# Patient Record
Sex: MALE | Race: WHITE | NOT HISPANIC OR LATINO | Employment: OTHER | URBAN - METROPOLITAN AREA
[De-identification: names, ages, dates, MRNs, and addresses within clinical notes are randomized per-mention and may not be internally consistent; named-entity substitution may affect disease eponyms.]

---

## 2017-05-13 ENCOUNTER — HOSPITAL ENCOUNTER (EMERGENCY)
Facility: HOSPITAL | Age: 62
Discharge: HOME | End: 2017-05-13
Attending: EMERGENCY MEDICINE | Admitting: EMERGENCY MEDICINE
Payer: COMMERCIAL

## 2017-05-13 ENCOUNTER — APPOINTMENT (EMERGENCY)
Dept: RADIOLOGY | Facility: HOSPITAL | Age: 62
End: 2017-05-13
Payer: COMMERCIAL

## 2017-05-13 ENCOUNTER — APPOINTMENT (EMERGENCY)
Dept: CT IMAGING | Facility: HOSPITAL | Age: 62
End: 2017-05-13
Payer: COMMERCIAL

## 2017-05-13 VITALS
SYSTOLIC BLOOD PRESSURE: 154 MMHG | WEIGHT: 216.2 LBS | OXYGEN SATURATION: 96 % | DIASTOLIC BLOOD PRESSURE: 75 MMHG | TEMPERATURE: 98.2 F | HEART RATE: 60 BPM | RESPIRATION RATE: 16 BRPM

## 2017-05-13 DIAGNOSIS — R07.9 RIGHT-SIDED CHEST PAIN: Primary | ICD-10-CM

## 2017-05-13 LAB
ALBUMIN SERPL BCP-MCNC: 4.3 G/DL (ref 3.5–5)
ALP SERPL-CCNC: 60 U/L (ref 46–116)
ALT SERPL W P-5'-P-CCNC: 28 U/L (ref 12–78)
ANION GAP SERPL CALCULATED.3IONS-SCNC: 8 MMOL/L (ref 4–13)
APTT PPP: 26 SECONDS (ref 23–35)
AST SERPL W P-5'-P-CCNC: 19 U/L (ref 5–45)
ATRIAL RATE: 57 BPM
BASOPHILS # BLD AUTO: 0.02 THOUSANDS/ΜL (ref 0–0.1)
BASOPHILS NFR BLD AUTO: 0 % (ref 0–1)
BILIRUB DIRECT SERPL-MCNC: 0.11 MG/DL (ref 0–0.2)
BILIRUB SERPL-MCNC: 0.6 MG/DL (ref 0.2–1)
BUN SERPL-MCNC: 17 MG/DL (ref 5–25)
CALCIUM SERPL-MCNC: 9.6 MG/DL (ref 8.3–10.1)
CHLORIDE SERPL-SCNC: 103 MMOL/L (ref 100–108)
CO2 SERPL-SCNC: 30 MMOL/L (ref 21–32)
CREAT SERPL-MCNC: 1.07 MG/DL (ref 0.6–1.3)
EOSINOPHIL # BLD AUTO: 0.07 THOUSAND/ΜL (ref 0–0.61)
EOSINOPHIL NFR BLD AUTO: 1 % (ref 0–6)
ERYTHROCYTE [DISTWIDTH] IN BLOOD BY AUTOMATED COUNT: 12.7 % (ref 11.6–15.1)
GFR SERPL CREATININE-BSD FRML MDRD: >60 ML/MIN/1.73SQ M
GLUCOSE SERPL-MCNC: 168 MG/DL (ref 65–140)
HCT VFR BLD AUTO: 36 % (ref 36.5–49.3)
HGB BLD-MCNC: 12.4 G/DL (ref 12–17)
INR PPP: 1.1 (ref 0.86–1.16)
LYMPHOCYTES # BLD AUTO: 1.64 THOUSANDS/ΜL (ref 0.6–4.47)
LYMPHOCYTES NFR BLD AUTO: 25 % (ref 14–44)
MCH RBC QN AUTO: 29 PG (ref 26.8–34.3)
MCHC RBC AUTO-ENTMCNC: 34.4 G/DL (ref 31.4–37.4)
MCV RBC AUTO: 84 FL (ref 82–98)
MONOCYTES # BLD AUTO: 0.55 THOUSAND/ΜL (ref 0.17–1.22)
MONOCYTES NFR BLD AUTO: 9 % (ref 4–12)
NEUTROPHILS # BLD AUTO: 4.22 THOUSANDS/ΜL (ref 1.85–7.62)
NEUTS SEG NFR BLD AUTO: 65 % (ref 43–75)
NT-PROBNP SERPL-MCNC: 117 PG/ML
P AXIS: 62 DEGREES
PLATELET # BLD AUTO: 234 THOUSANDS/UL (ref 149–390)
PMV BLD AUTO: 9.8 FL (ref 8.9–12.7)
POTASSIUM SERPL-SCNC: 4.6 MMOL/L (ref 3.5–5.3)
PR INTERVAL: 172 MS
PROT SERPL-MCNC: 7.1 G/DL (ref 6.4–8.2)
PROTHROMBIN TIME: 14.6 SECONDS (ref 12.1–14.4)
QRS AXIS: -28 DEGREES
QRSD INTERVAL: 114 MS
QT INTERVAL: 434 MS
QTC INTERVAL: 415 MS
RBC # BLD AUTO: 4.27 MILLION/UL (ref 3.88–5.62)
SODIUM SERPL-SCNC: 141 MMOL/L (ref 136–145)
T WAVE AXIS: 39 DEGREES
TROPONIN I SERPL-MCNC: <0.02 NG/ML
TROPONIN I SERPL-MCNC: <0.02 NG/ML
VENTRICULAR RATE: 55 BPM
WBC # BLD AUTO: 6.5 THOUSAND/UL (ref 4.31–10.16)

## 2017-05-13 PROCEDURE — 71020 HB CHEST X-RAY 2VW FRONTAL&LATL: CPT

## 2017-05-13 PROCEDURE — 84484 ASSAY OF TROPONIN QUANT: CPT | Performed by: EMERGENCY MEDICINE

## 2017-05-13 PROCEDURE — 96374 THER/PROPH/DIAG INJ IV PUSH: CPT

## 2017-05-13 PROCEDURE — 99285 EMERGENCY DEPT VISIT HI MDM: CPT

## 2017-05-13 PROCEDURE — 74175 CTA ABDOMEN W/CONTRAST: CPT

## 2017-05-13 PROCEDURE — 85610 PROTHROMBIN TIME: CPT | Performed by: EMERGENCY MEDICINE

## 2017-05-13 PROCEDURE — 71275 CT ANGIOGRAPHY CHEST: CPT

## 2017-05-13 PROCEDURE — 80076 HEPATIC FUNCTION PANEL: CPT | Performed by: EMERGENCY MEDICINE

## 2017-05-13 PROCEDURE — 96361 HYDRATE IV INFUSION ADD-ON: CPT

## 2017-05-13 PROCEDURE — 93005 ELECTROCARDIOGRAM TRACING: CPT

## 2017-05-13 PROCEDURE — 80048 BASIC METABOLIC PNL TOTAL CA: CPT | Performed by: EMERGENCY MEDICINE

## 2017-05-13 PROCEDURE — 85025 COMPLETE CBC W/AUTO DIFF WBC: CPT | Performed by: EMERGENCY MEDICINE

## 2017-05-13 PROCEDURE — 83880 ASSAY OF NATRIURETIC PEPTIDE: CPT | Performed by: EMERGENCY MEDICINE

## 2017-05-13 PROCEDURE — 85730 THROMBOPLASTIN TIME PARTIAL: CPT | Performed by: EMERGENCY MEDICINE

## 2017-05-13 PROCEDURE — 36415 COLL VENOUS BLD VENIPUNCTURE: CPT | Performed by: EMERGENCY MEDICINE

## 2017-05-13 RX ORDER — LISINOPRIL 20 MG/1
20 TABLET ORAL 2 TIMES DAILY
COMMUNITY

## 2017-05-13 RX ORDER — HYDRALAZINE HYDROCHLORIDE 20 MG/ML
10 INJECTION INTRAMUSCULAR; INTRAVENOUS ONCE
Status: COMPLETED | OUTPATIENT
Start: 2017-05-13 | End: 2017-05-13

## 2017-05-13 RX ORDER — SIMVASTATIN 10 MG
10 TABLET ORAL
COMMUNITY

## 2017-05-13 RX ORDER — ASPIRIN 81 MG/1
81 TABLET, CHEWABLE ORAL DAILY
Status: ON HOLD | COMMUNITY
End: 2022-03-31 | Stop reason: ALTCHOICE

## 2017-05-13 RX ORDER — LIDOCAINE 50 MG/G
1 PATCH TOPICAL EVERY 24 HOURS
Qty: 15 PATCH | Refills: 0 | Status: ON HOLD | OUTPATIENT
Start: 2017-05-13 | End: 2022-03-31 | Stop reason: ALTCHOICE

## 2017-05-13 RX ORDER — HYDROCHLOROTHIAZIDE 12.5 MG/1
12.5 CAPSULE, GELATIN COATED ORAL DAILY
Status: ON HOLD | COMMUNITY
End: 2022-03-31 | Stop reason: ALTCHOICE

## 2017-05-13 RX ADMIN — SODIUM CHLORIDE 1000 ML: 0.9 INJECTION, SOLUTION INTRAVENOUS at 15:07

## 2017-05-13 RX ADMIN — IOHEXOL 100 ML: 350 INJECTION, SOLUTION INTRAVENOUS at 15:50

## 2017-05-13 RX ADMIN — HYDRALAZINE HYDROCHLORIDE 10 MG: 20 INJECTION INTRAMUSCULAR; INTRAVENOUS at 15:57

## 2017-05-16 ENCOUNTER — HOSPITAL ENCOUNTER (EMERGENCY)
Facility: HOSPITAL | Age: 62
Discharge: HOME | End: 2017-05-16
Attending: EMERGENCY MEDICINE | Admitting: EMERGENCY MEDICINE
Payer: COMMERCIAL

## 2017-05-16 VITALS
DIASTOLIC BLOOD PRESSURE: 72 MMHG | TEMPERATURE: 98.2 F | WEIGHT: 214.8 LBS | HEART RATE: 60 BPM | OXYGEN SATURATION: 96 % | SYSTOLIC BLOOD PRESSURE: 170 MMHG | RESPIRATION RATE: 16 BRPM

## 2017-05-16 DIAGNOSIS — M25.511 SHOULDER PAIN, RIGHT: Primary | ICD-10-CM

## 2017-05-16 LAB
ATRIAL RATE: 56 BPM
P AXIS: 54 DEGREES
PR INTERVAL: 170 MS
QRS AXIS: -35 DEGREES
QRSD INTERVAL: 102 MS
QT INTERVAL: 426 MS
QTC INTERVAL: 411 MS
T WAVE AXIS: 46 DEGREES
VENTRICULAR RATE: 56 BPM

## 2017-05-16 PROCEDURE — 99283 EMERGENCY DEPT VISIT LOW MDM: CPT

## 2017-05-16 RX ORDER — OXYCODONE HYDROCHLORIDE AND ACETAMINOPHEN 5; 325 MG/1; MG/1
2 TABLET ORAL ONCE
Status: COMPLETED | OUTPATIENT
Start: 2017-05-16 | End: 2017-05-16

## 2017-05-16 RX ORDER — OXYCODONE HYDROCHLORIDE AND ACETAMINOPHEN 5; 325 MG/1; MG/1
2 TABLET ORAL EVERY 6 HOURS PRN
Qty: 20 TABLET | Refills: 0 | Status: ON HOLD | OUTPATIENT
Start: 2017-05-16 | End: 2022-03-31 | Stop reason: ALTCHOICE

## 2017-05-16 RX ADMIN — OXYCODONE HYDROCHLORIDE AND ACETAMINOPHEN 2 TABLET: 5; 325 TABLET ORAL at 10:05

## 2021-04-08 DIAGNOSIS — Z23 ENCOUNTER FOR IMMUNIZATION: ICD-10-CM

## 2022-03-11 ENCOUNTER — APPOINTMENT (OUTPATIENT)
Dept: LAB | Facility: HOSPITAL | Age: 67
End: 2022-03-11
Attending: SPECIALIST
Payer: COMMERCIAL

## 2022-03-11 ENCOUNTER — HOSPITAL ENCOUNTER (OUTPATIENT)
Dept: ULTRASOUND IMAGING | Facility: HOSPITAL | Age: 67
Discharge: HOME | End: 2022-03-11
Attending: SPECIALIST
Payer: COMMERCIAL

## 2022-03-11 DIAGNOSIS — N50.82 SCROTAL PAIN: ICD-10-CM

## 2022-03-11 DIAGNOSIS — R35.1 NOCTURIA: ICD-10-CM

## 2022-03-11 LAB — PSA SERPL-MCNC: 2.1 NG/ML (ref 0–4)

## 2022-03-11 PROCEDURE — 76870 US EXAM SCROTUM: CPT

## 2022-03-11 PROCEDURE — G0103 PSA SCREENING: HCPCS

## 2022-03-11 PROCEDURE — 36415 COLL VENOUS BLD VENIPUNCTURE: CPT

## 2022-03-28 ENCOUNTER — APPOINTMENT (OUTPATIENT)
Dept: LAB | Facility: HOSPITAL | Age: 67
End: 2022-03-28
Attending: SPECIALIST
Payer: COMMERCIAL

## 2022-03-28 ENCOUNTER — LAB (OUTPATIENT)
Dept: LAB | Facility: HOSPITAL | Age: 67
End: 2022-03-28
Attending: SPECIALIST
Payer: COMMERCIAL

## 2022-03-28 DIAGNOSIS — N43.3 HYDROCELE, UNSPECIFIED HYDROCELE TYPE: ICD-10-CM

## 2022-03-28 LAB
ANION GAP SERPL CALCULATED.3IONS-SCNC: 7 MMOL/L (ref 4–13)
BASOPHILS # BLD AUTO: 0.02 THOUSANDS/ΜL (ref 0–0.1)
BASOPHILS NFR BLD AUTO: 0 % (ref 0–1)
BUN SERPL-MCNC: 20 MG/DL (ref 5–25)
CALCIUM SERPL-MCNC: 9.9 MG/DL (ref 8.4–10.2)
CHLORIDE SERPL-SCNC: 102 MMOL/L (ref 96–108)
CO2 SERPL-SCNC: 26 MMOL/L (ref 21–32)
CREAT SERPL-MCNC: 1.6 MG/DL (ref 0.6–1.3)
EOSINOPHIL # BLD AUTO: 0.03 THOUSAND/ΜL (ref 0–0.61)
EOSINOPHIL NFR BLD AUTO: 0 % (ref 0–6)
ERYTHROCYTE [DISTWIDTH] IN BLOOD BY AUTOMATED COUNT: 12.9 % (ref 11.6–15.1)
GFR SERPL CREATININE-BSD FRML MDRD: 43 ML/MIN/1.73SQ M
GLUCOSE SERPL-MCNC: 307 MG/DL (ref 65–140)
HCT VFR BLD AUTO: 34.6 % (ref 36.5–49.3)
HGB BLD-MCNC: 12 G/DL (ref 12–17)
IMM GRANULOCYTES # BLD AUTO: 0.01 THOUSAND/UL (ref 0–0.2)
IMM GRANULOCYTES NFR BLD AUTO: 0 % (ref 0–2)
LYMPHOCYTES # BLD AUTO: 1.98 THOUSANDS/ΜL (ref 0.6–4.47)
LYMPHOCYTES NFR BLD AUTO: 28 % (ref 14–44)
MCH RBC QN AUTO: 29.5 PG (ref 26.8–34.3)
MCHC RBC AUTO-ENTMCNC: 34.7 G/DL (ref 31.4–37.4)
MCV RBC AUTO: 85 FL (ref 82–98)
MONOCYTES # BLD AUTO: 0.62 THOUSAND/ΜL (ref 0.17–1.22)
MONOCYTES NFR BLD AUTO: 9 % (ref 4–12)
NEUTROPHILS # BLD AUTO: 4.34 THOUSANDS/ΜL (ref 1.85–7.62)
NEUTS SEG NFR BLD AUTO: 63 % (ref 43–75)
NRBC BLD AUTO-RTO: 0 /100 WBCS
PLATELET # BLD AUTO: 255 THOUSANDS/UL (ref 149–390)
PMV BLD AUTO: 9.7 FL (ref 8.9–12.7)
POTASSIUM SERPL-SCNC: 5 MMOL/L (ref 3.5–5.3)
RBC # BLD AUTO: 4.07 MILLION/UL (ref 3.88–5.62)
SODIUM SERPL-SCNC: 135 MMOL/L (ref 135–147)
WBC # BLD AUTO: 7 THOUSAND/UL (ref 4.31–10.16)

## 2022-03-28 PROCEDURE — 36415 COLL VENOUS BLD VENIPUNCTURE: CPT

## 2022-03-28 PROCEDURE — 93005 ELECTROCARDIOGRAM TRACING: CPT

## 2022-03-28 PROCEDURE — 80048 BASIC METABOLIC PNL TOTAL CA: CPT

## 2022-03-28 PROCEDURE — 85025 COMPLETE CBC W/AUTO DIFF WBC: CPT

## 2022-03-28 RX ORDER — INDOMETHACIN 50 MG/1
50 CAPSULE ORAL 2 TIMES DAILY WITH MEALS
COMMUNITY

## 2022-03-28 RX ORDER — AMLODIPINE BESYLATE 10 MG/1
10 TABLET ORAL DAILY
COMMUNITY

## 2022-03-28 RX ORDER — PANTOPRAZOLE SODIUM 40 MG/1
40 TABLET, DELAYED RELEASE ORAL 2 TIMES DAILY
COMMUNITY

## 2022-03-28 NOTE — PRE-PROCEDURE INSTRUCTIONS
My Surgical Experience    The following information was developed to assist you to prepare for your operation  What do I need to do before coming to the hospital?   Arrange for a responsible person to drive you to and from the hospital    Arrange care for your children at home  Children are not allowed in the recovery areas of the hospital   Plan to wear clothing that is easy to put on and take off  If you are having shoulder surgery, wear a shirt that buttons or zippers in the front  Bathing  o Shower the evening before and the morning of your surgery with an antibacterial soap  Please refer to the Pre Op Showering Instructions for Surgery Patients Sheet   o Remove nail polish and all body piercing jewelry  o Do not shave any body part for at least 24 hours before surgery-this includes face, arms, legs and upper body  Food  o Nothing to eat or drink after midnight the night before your surgery  This includes candy and chewing gum  o Exception: If your surgery is after 12:00pm (noon), you may have clear liquids such as 7-Up®, ginger ale, apple or cranberry juice, Jell-O®, water, or clear broth until 8:00 am  o Do not drink milk or juice with pulp on the morning before surgery  o Do not drink alcohol 24 hours before surgery  Medicine  o Follow instructions you received from your surgeon about which medicines you may take on the day of surgery  o If instructed to take medicine on the morning of surgery, take pills with just a small sip of water  Call your prescribing doctor for specific infroamtion on what to do if you take insulin    What should I bring to the hospital?    Bring:  Jolie Butcher or a walker, if you have them, for foot or knee surgery   A list of the daily medicines, vitamins, minerals, herbals and nutritional supplements you take   Include the dosages of medicines and the time you take them each day   Glasses, dentures or hearing aids   Minimal clothing; you will be wearing hospital sleepwear   Photo ID; required to verify your identity   If you have a Living Will or Power of , bring a copy of the documents   If you have an ostomy, bring an extra pouch and any supplies you use    Do not bring   Medicines or inhalers   Money, valuables or jewelry    What other information should I know about the day of surgery?  Notify your surgeons if you develop a cold, sore throat, cough, fever, rash or any other illness   Report to the Ambulatory Surgical/Same Day Surgery Unit   You will be instructed to stop at Registration only if you have not been pre-registered   Inform your  fi they do not stay that they will be asked by the staff to leave a phone number where they can be reached   Be available to be reached before surgery  In the event the operating room schedule changes, you may be asked to come in earlier or later than expected    *It is important to tell your doctor and others involved in your health care if you are taking or have been taking any non-prescription drugs, vitamins, minerals, herbals or other nutritional supplements  Any of these may interact with some food or medicines and cause a reaction      Pre-Surgery Instructions:   Medication Instructions    amLODIPine (NORVASC) 10 mg tablet Instructed patient per Anesthesia Guidelines   indomethacin (INDOCIN) 50 mg capsule Instructed patient per Anesthesia Guidelines   lisinopril (ZESTRIL) 20 mg tablet Instructed patient per Anesthesia Guidelines   pantoprazole (PROTONIX) 40 mg tablet Instructed patient per Anesthesia Guidelines   sertraline (ZOLOFT) 50 mg tablet Instructed patient per Anesthesia Guidelines   simvastatin (ZOCOR) 10 mg tablet Instructed patient per Anesthesia Guidelines   sitaGLIPtin-metFORMIN (JANUMET)  MG per tablet Instructed patient per Anesthesia Guidelines  To take amlodipine, protonix and zoloft a m  of surgery

## 2022-03-29 ENCOUNTER — ANESTHESIA EVENT (OUTPATIENT)
Dept: PERIOP | Facility: HOSPITAL | Age: 67
End: 2022-03-29
Payer: COMMERCIAL

## 2022-03-29 PROBLEM — M10.9 GOUT: Status: ACTIVE | Noted: 2022-03-29

## 2022-03-29 PROBLEM — I10 HTN (HYPERTENSION): Status: ACTIVE | Noted: 2022-03-29

## 2022-03-29 PROBLEM — E11.9 DIABETES MELLITUS, TYPE 2 (HCC): Status: ACTIVE | Noted: 2022-03-29

## 2022-03-29 PROBLEM — E78.5 HYPERLIPIDEMIA: Status: ACTIVE | Noted: 2022-03-29

## 2022-03-29 NOTE — ANESTHESIA PREPROCEDURE EVALUATION
Procedure:  HYDROCELECTOMY (Right Groin)    Relevant Problems   CARDIO   (+) HTN (hypertension)   (+) Hyperlipidemia      ENDO   (+) Diabetes mellitus, type 2 (HCC)      MUSCULOSKELETAL   (+) Gout             Anesthesia Plan  ASA Score- 2     Anesthesia Type- general with ASA Monitors  Additional Monitors:   Airway Plan: LMA  Plan Factors-    Chart reviewed  Patient is not a current smoker  Induction- intravenous  Postoperative Plan- Plan for postoperative opioid use  Informed Consent- Anesthetic plan and risks discussed with patient  I personally reviewed this patient with the CRNA  Discussed and agreed on the Anesthesia Plan with the CRNA  Dangelo Tejada

## 2022-03-30 LAB
ATRIAL RATE: 60 BPM
P AXIS: 62 DEGREES
PR INTERVAL: 162 MS
QRS AXIS: -31 DEGREES
QRSD INTERVAL: 110 MS
QT INTERVAL: 366 MS
QTC INTERVAL: 366 MS
T WAVE AXIS: 37 DEGREES
VENTRICULAR RATE: 60 BPM

## 2022-03-30 PROCEDURE — 93010 ELECTROCARDIOGRAM REPORT: CPT | Performed by: INTERNAL MEDICINE

## 2022-03-30 NOTE — H&P
H&P Exam - Urology       Patient: Levi Kiser  : 1955 Sex: male   MRN: [id number]     CSN: [id number]      History of Present Illness   HPI:  Levi Kiser  is a 75 y o  male who presents with worsening painful swollen right benign hydrocele  Patient states in the office that is really bothering him and wishes it repaired had left hydrocelectomy done years ago aware risk of anesthesia infection bleeding postop drain postop swelling for 3-4 weeks           Review of Systems:   Constitutional:  Negative for activity change, fever, chills and diaphoresis  HENT: Negative for hearing loss and trouble swallowing  Eyes: Negative for itching and visual disturbance  Respiratory: Negative for chest tightness and shortness of breath  Cardiovascular: Negative for chest pain, edema  Gastrointestinal: Negative for abdominal distention, na abdominal pain, constipation, diarrhea, Nausea and vomiting  Genitourinary: Negative for decreased urine volume, difficulty urinating, dysuria, enuresis, frequency, hematuria and urgency  Musculoskeletal: Negative for gait problem and myalgias  Neurological: Negative for dizziness and headaches  Hematological: Does not bruise/bleed easily         Historical Information   Past Medical History:   Diagnosis Date    Diabetes mellitus ([facility] [address] )     Gout     High blood pressure     Hyperlipidemia     Hypertension     Sigmoid diverticulosis 2015     Past Surgical History:   Procedure Laterality Date    HYDROCELE EXCISION / REPAIR       Social History   Social History     Substance and Sexual Activity   Alcohol Use No     Social History     Substance and Sexual Activity   Drug Use No     Social History     Tobacco Use   Smoking Status Former Smoker    Packs/day: 1 00    Years: 18 00    Pack years: 18 00   Smokeless Tobacco Never Used     Family History:   Family History   Problem Relation Age of Onset    Colon cancer Mother     Uterine cancer Mother    Madelaine Gallagher Heart attack Father     Breast cancer Sister        Meds/Allergies   No medications prior to admission  No Known Allergies    Objective   Vitals: Ht 5' 10" (1 778 m)   Wt 98 4 kg (217 lb)   BMI 31 14 kg/m²     Physical Exam:  General Alert awake   Normocephalic atraumatic PERRLA  Lungs clear bilaterally  Cardiac normal S1 normal S2  Abdomen soft, flank pain  Extremities no edema    No intake/output data recorded      Invasive Devices  Report    None                     Lab Results: CBC:   Lab Results   Component Value Date    WBC 7 00 03/28/2022    HGB 12 0 03/28/2022    HCT 34 6 (L) 03/28/2022    MCV 85 03/28/2022     03/28/2022    MCH 29 5 03/28/2022    MCHC 34 7 03/28/2022    RDW 12 9 03/28/2022    MPV 9 7 03/28/2022    NRBC 0 03/28/2022     CMP:   Lab Results   Component Value Date     03/28/2022    CO2 26 03/28/2022    BUN 20 03/28/2022    CREATININE 1 60 (H) 03/28/2022    CALCIUM 9 9 03/28/2022    AST 19 05/13/2017    ALT 28 05/13/2017    ALKPHOS 60 05/13/2017    EGFR 43 03/28/2022     Urinalysis: No results found for: Radha Safer, SPECGRAV, PHUR, LEUKOCYTESUR, NITRITE, PROTEINUA, GLUCOSEU, KETONESU, BILIRUBINUR, BLOODU  Urine Culture: No results found for: URINECX  PSA:   Lab Results   Component Value Date    PSA 2 1 03/11/2022           Assessment/ Plan:  Right hydrocelectomy      Ghassan Horn MD

## 2022-03-31 ENCOUNTER — HOSPITAL ENCOUNTER (OUTPATIENT)
Facility: HOSPITAL | Age: 67
Setting detail: OUTPATIENT SURGERY
Discharge: HOME | End: 2022-03-31
Attending: SPECIALIST | Admitting: SPECIALIST
Payer: COMMERCIAL

## 2022-03-31 ENCOUNTER — ANESTHESIA (OUTPATIENT)
Dept: PERIOP | Facility: HOSPITAL | Age: 67
End: 2022-03-31
Payer: COMMERCIAL

## 2022-03-31 VITALS
SYSTOLIC BLOOD PRESSURE: 139 MMHG | DIASTOLIC BLOOD PRESSURE: 65 MMHG | TEMPERATURE: 97.9 F | OXYGEN SATURATION: 94 % | WEIGHT: 215.8 LBS | HEART RATE: 67 BPM | BODY MASS INDEX: 30.9 KG/M2 | RESPIRATION RATE: 16 BRPM | HEIGHT: 70 IN

## 2022-03-31 DIAGNOSIS — N43.3 HYDROCELE, UNSPECIFIED: ICD-10-CM

## 2022-03-31 LAB — GLUCOSE SERPL-MCNC: 185 MG/DL (ref 65–140)

## 2022-03-31 PROCEDURE — 88302 TISSUE EXAM BY PATHOLOGIST: CPT | Performed by: PATHOLOGY

## 2022-03-31 PROCEDURE — 82948 REAGENT STRIP/BLOOD GLUCOSE: CPT

## 2022-03-31 RX ORDER — LIDOCAINE HYDROCHLORIDE 10 MG/ML
INJECTION, SOLUTION EPIDURAL; INFILTRATION; INTRACAUDAL; PERINEURAL AS NEEDED
Status: DISCONTINUED | OUTPATIENT
Start: 2022-03-31 | End: 2022-03-31 | Stop reason: HOSPADM

## 2022-03-31 RX ORDER — PROPOFOL 10 MG/ML
INJECTION, EMULSION INTRAVENOUS AS NEEDED
Status: DISCONTINUED | OUTPATIENT
Start: 2022-03-31 | End: 2022-03-31

## 2022-03-31 RX ORDER — HYDROMORPHONE HCL/PF 1 MG/ML
0.5 SYRINGE (ML) INJECTION
Status: DISCONTINUED | OUTPATIENT
Start: 2022-03-31 | End: 2022-03-31 | Stop reason: HOSPADM

## 2022-03-31 RX ORDER — OXYCODONE HYDROCHLORIDE AND ACETAMINOPHEN 5; 325 MG/1; MG/1
1 TABLET ORAL EVERY 4 HOURS PRN
Status: DISCONTINUED | OUTPATIENT
Start: 2022-03-31 | End: 2022-03-31 | Stop reason: HOSPADM

## 2022-03-31 RX ORDER — EPHEDRINE SULFATE 50 MG/ML
INJECTION INTRAVENOUS AS NEEDED
Status: DISCONTINUED | OUTPATIENT
Start: 2022-03-31 | End: 2022-03-31

## 2022-03-31 RX ORDER — GINSENG 100 MG
CAPSULE ORAL AS NEEDED
Status: DISCONTINUED | OUTPATIENT
Start: 2022-03-31 | End: 2022-03-31 | Stop reason: HOSPADM

## 2022-03-31 RX ORDER — FENTANYL CITRATE 50 UG/ML
INJECTION, SOLUTION INTRAMUSCULAR; INTRAVENOUS AS NEEDED
Status: DISCONTINUED | OUTPATIENT
Start: 2022-03-31 | End: 2022-03-31

## 2022-03-31 RX ORDER — LIDOCAINE HYDROCHLORIDE 10 MG/ML
INJECTION, SOLUTION EPIDURAL; INFILTRATION; INTRACAUDAL; PERINEURAL AS NEEDED
Status: DISCONTINUED | OUTPATIENT
Start: 2022-03-31 | End: 2022-03-31

## 2022-03-31 RX ORDER — DEXAMETHASONE SODIUM PHOSPHATE 10 MG/ML
INJECTION, SOLUTION INTRAMUSCULAR; INTRAVENOUS AS NEEDED
Status: DISCONTINUED | OUTPATIENT
Start: 2022-03-31 | End: 2022-03-31

## 2022-03-31 RX ORDER — ONDANSETRON 2 MG/ML
4 INJECTION INTRAMUSCULAR; INTRAVENOUS ONCE AS NEEDED
Status: DISCONTINUED | OUTPATIENT
Start: 2022-03-31 | End: 2022-03-31 | Stop reason: HOSPADM

## 2022-03-31 RX ORDER — MIDAZOLAM HYDROCHLORIDE 2 MG/2ML
INJECTION, SOLUTION INTRAMUSCULAR; INTRAVENOUS AS NEEDED
Status: DISCONTINUED | OUTPATIENT
Start: 2022-03-31 | End: 2022-03-31

## 2022-03-31 RX ORDER — MEPERIDINE HYDROCHLORIDE 25 MG/ML
12.5 INJECTION INTRAMUSCULAR; INTRAVENOUS; SUBCUTANEOUS
Status: DISCONTINUED | OUTPATIENT
Start: 2022-03-31 | End: 2022-03-31 | Stop reason: HOSPADM

## 2022-03-31 RX ORDER — CEFAZOLIN SODIUM 2 G/50ML
2000 SOLUTION INTRAVENOUS ONCE
Status: COMPLETED | OUTPATIENT
Start: 2022-03-31 | End: 2022-03-31

## 2022-03-31 RX ORDER — SODIUM CHLORIDE, SODIUM LACTATE, POTASSIUM CHLORIDE, CALCIUM CHLORIDE 600; 310; 30; 20 MG/100ML; MG/100ML; MG/100ML; MG/100ML
75 INJECTION, SOLUTION INTRAVENOUS CONTINUOUS
Status: DISCONTINUED | OUTPATIENT
Start: 2022-03-31 | End: 2022-03-31 | Stop reason: HOSPADM

## 2022-03-31 RX ORDER — ONDANSETRON 2 MG/ML
INJECTION INTRAMUSCULAR; INTRAVENOUS AS NEEDED
Status: DISCONTINUED | OUTPATIENT
Start: 2022-03-31 | End: 2022-03-31

## 2022-03-31 RX ORDER — FENTANYL CITRATE/PF 50 MCG/ML
25 SYRINGE (ML) INJECTION
Status: DISCONTINUED | OUTPATIENT
Start: 2022-03-31 | End: 2022-03-31 | Stop reason: HOSPADM

## 2022-03-31 RX ADMIN — ONDANSETRON 4 MG: 2 INJECTION INTRAMUSCULAR; INTRAVENOUS at 08:24

## 2022-03-31 RX ADMIN — FENTANYL CITRATE 25 MCG: 50 INJECTION, SOLUTION INTRAMUSCULAR; INTRAVENOUS at 08:35

## 2022-03-31 RX ADMIN — SODIUM CHLORIDE, SODIUM LACTATE, POTASSIUM CHLORIDE, AND CALCIUM CHLORIDE: .6; .31; .03; .02 INJECTION, SOLUTION INTRAVENOUS at 09:23

## 2022-03-31 RX ADMIN — FENTANYL CITRATE 25 MCG: 50 INJECTION, SOLUTION INTRAMUSCULAR; INTRAVENOUS at 08:25

## 2022-03-31 RX ADMIN — MIDAZOLAM 2 MG: 1 INJECTION INTRAMUSCULAR; INTRAVENOUS at 08:15

## 2022-03-31 RX ADMIN — CEFAZOLIN SODIUM 2000 MG: 2 SOLUTION INTRAVENOUS at 08:18

## 2022-03-31 RX ADMIN — SODIUM CHLORIDE, SODIUM LACTATE, POTASSIUM CHLORIDE, AND CALCIUM CHLORIDE: .6; .31; .03; .02 INJECTION, SOLUTION INTRAVENOUS at 08:13

## 2022-03-31 RX ADMIN — OXYCODONE HYDROCHLORIDE AND ACETAMINOPHEN 1 TABLET: 5; 325 TABLET ORAL at 10:11

## 2022-03-31 RX ADMIN — FENTANYL CITRATE 25 MCG: 50 INJECTION INTRAMUSCULAR; INTRAVENOUS at 10:00

## 2022-03-31 RX ADMIN — FENTANYL CITRATE 25 MCG: 50 INJECTION INTRAMUSCULAR; INTRAVENOUS at 09:43

## 2022-03-31 RX ADMIN — EPHEDRINE SULFATE 10 MG: 50 INJECTION, SOLUTION INTRAVENOUS at 08:29

## 2022-03-31 RX ADMIN — EPHEDRINE SULFATE 10 MG: 50 INJECTION, SOLUTION INTRAVENOUS at 08:40

## 2022-03-31 RX ADMIN — FENTANYL CITRATE 25 MCG: 50 INJECTION, SOLUTION INTRAMUSCULAR; INTRAVENOUS at 09:13

## 2022-03-31 RX ADMIN — FENTANYL CITRATE 25 MCG: 50 INJECTION, SOLUTION INTRAMUSCULAR; INTRAVENOUS at 09:00

## 2022-03-31 RX ADMIN — PROPOFOL 180 MG: 10 INJECTION, EMULSION INTRAVENOUS at 08:19

## 2022-03-31 RX ADMIN — DEXAMETHASONE SODIUM PHOSPHATE 4 MG: 10 INJECTION, SOLUTION INTRAMUSCULAR; INTRAVENOUS at 08:24

## 2022-03-31 RX ADMIN — EPHEDRINE SULFATE 10 MG: 50 INJECTION, SOLUTION INTRAVENOUS at 08:33

## 2022-03-31 RX ADMIN — LIDOCAINE HYDROCHLORIDE 50 MG: 10 INJECTION, SOLUTION EPIDURAL; INFILTRATION; INTRACAUDAL; PERINEURAL at 08:19

## 2022-03-31 RX ADMIN — FENTANYL CITRATE 25 MCG: 50 INJECTION INTRAMUSCULAR; INTRAVENOUS at 09:51

## 2022-03-31 NOTE — PERIOPERATIVE NURSING NOTE
Patient medicated with percocet PO for sharp constant pain in scrotal area 5/10  No bleeding noted on scrotal dressing, scrotal support in place

## 2022-03-31 NOTE — PROGRESS NOTES
Progress Note - Urology      Patient: De Da Silva  : 1955 Sex: male   MRN: [id number]     CSN: [id number]  Unit/Bed#: OR POOL     SUBJECTIVE:   Severe right swelling orchalgia few years  Status post left hydrocelectomy  Aware risk of anesthesia infection bleeding drain additional postop swelling      Objective   Vitals: /75   Pulse 67   Temp (!) 97 3 °F (36 3 °C) (Temporal)   Resp 18   Ht 5' 10" (1 778 m)   Wt 97 9 kg (215 lb 12 8 oz)   SpO2 96%   BMI 30 96 kg/m²     No intake/output data recorded        Physical Exam:   General Alert awake   Normocephalic atraumatic PERRLA  Lungs clear bilaterally  Cardiac normal S1 normal S2  Abdomen soft, flank pain  Extremities no edema      Lab Results: CBC:   Lab Results   Component Value Date    WBC 7 00 2022    HGB 12 0 2022    HCT 34 6 (L) 2022    MCV 85 2022     2022    MCH 29 5 2022    MCHC 34 7 2022    RDW 12 9 2022    MPV 9 7 2022    NRBC 0 2022     CMP:   Lab Results   Component Value Date     2022    CO2 26 2022    BUN 20 2022    CREATININE 1 60 (H) 2022    CALCIUM 9 9 2022    AST 19 2017    ALT 28 2017    ALKPHOS 60 2017    EGFR 43 2022     Urinalysis: No results found for: Nancy Raul, SPECGRAV, PHUR, LEUKOCYTESUR, NITRITE, PROTEINUA, GLUCOSEU, KETONESU, BILIRUBINUR, BLOODU  Urine Culture: No results found for: URINECX  PSA:   Lab Results   Component Value Date    PSA 2 1 2022         Assessment/ Plan:  Right hydrocelectomy          Fito Ding MD

## 2022-03-31 NOTE — DISCHARGE INSTRUCTIONS
#1 no heavy straining or lifting above 10 pounds for 2 weeks    #2 call office fevers, chills, or worsening blood in the urine  #3 Patient has follow-up Dr Lizeth Prado tomorrow April 8th 930 to remove drain  Ice packs to the incision site tonight  Wear scrotal support 1 week will more  May need to change dressing few times a day  Can shower on Sunday  No bathtub    [facility]  [address] [facility] office  [address] [place] [address]  [phone]  8:30 AM to 4:30 PM  Monday through Friday    [place] office  [phone]  [place], [address]  1:00 to 5:00 PM  Wednesday     Penrose Drain   WHAT YOU NEED TO KNOW:   A Penrose drain helps prevent infection by allowing blood and other fluids to move away from your surgery site  DISCHARGE INSTRUCTIONS:   Call your doctor if:   · You develop a fever of 100 4 °F (38 °C) or higher  · Your drain comes out  · The skin around your drain is red, swollen, or hot to the touch  · You have new or increased pain at your drain or incision site  · You have questions or concerns about your condition or care  Care for your drain:  Change your dressing at least 2 times every day   Change your dressing at the same times each day  Also, change your dressing if it gets wet or is loose  · Gather supplies  You will need:    ? Gauze dressings    ? Soap and water    ? A clean towel and washcloth    ? Nonsterile gloves    ? Tape    ? A pair of scissors if the gauze does not already have a cut in it    · Wash your hands  Use soap and running water  Dry your hands with a clean towel or paper towel  You can also use an alcohol-based hand rub  · Remove the dressing  Be careful to not pull on the drain  Write down how much drainage is on the dressing, what color it is, and how it smells  Also check the area for signs of infection such as pus, redness, and swelling  · Wash your hands or use an alcohol-based gel again  Put on the gloves  · Wash your surgery area  Use the washcloth, soap, and water to gently clean around and under the drain  Use the towel to gently pat the area dry  · Put on clean dressing  Place a gauze under the drain  The part of the drain with the pin should go through the cut in the gauze  The drain should lie flat on the gauze  If there is no cut in the gauze, use clean scissors to cut the gauze  Be careful not to cut the drain  Start in the middle of one side and cut to the middle of the gauze  Cover the drain with another gauze and tape it in place  · Take off your gloves, throw them away, and wash your hands  © Copyright [employer] 2022 Information is for End User's use only and may not be sold, redistributed or otherwise used for commercial purposes  All illustrations and images included in CareNotes® are the copyrighted property of A D A M [employer] Inc  or [facility]   The above information is an  only  It is not intended as medical advice for individual conditions or treatments  Talk to your doctor, nurse or pharmacist before following any medical regimen to see if it is safe and effective for you

## 2022-03-31 NOTE — PERIOPERATIVE NURSING NOTE
Patient received into PACU via stretcher from OR  Patient has oral airway sleeping, pain not observed at this time  Scrotal dressings clean, dry and intact  Scrotal support in place

## 2022-03-31 NOTE — OP NOTE
OPERATIVE REPORT  PATIENT NAME: Neena Perales  :  1955  MRN: [id number]  Pt Location: [facility] OR ROOM 02    SURGERY DATE: 3/31/2022    Surgeon(s) and Role:     * Lena Collins MD - Primary    Preop Diagnosis:  Hydrocele, unspecified [N43 3]  Right 800 cc hydrocele    Post-Op Diagnosis Codes: * Hydrocele, unspecified [N43 3]    Procedure(s) (LRB):  HYDROCELECTOMY (Right)    Specimen(s):  ID Type Source Tests Collected by Time Destination   1 : right scrotal sac Tissue Hydrocele Sac TISSUE EXAM Lena Collins MD 3/31/2022 0647        Estimated Blood Loss:   Minimal    Drains:  Open Drain Right Other (Comment) (Active)   Number of days: 0       Anesthesia Type:   General    Operative Indications:  Hydrocele, unspecified [N43 3]  This 68-year-old male presented to the office with worsening right hydrocele discomfort select me aware risk of anesthesia infection bleeding postoperative prolonged normal testicular edema to be sent home with drain to be removed next week    Operative Findings:  Large right hydrocele   800 cc drained  Specimen sent  Bottle cap technique  Quarter-inch Penrose drain    Complications:   None    Procedure and Technique:  Patient identified in the holding area right side marked consent signed placed the op suite  After anesthesia was induced he was left in the spine position draped prep standard fashion  Time-out performed  A 4 cm incision was made over the right hemiscrotum hemostasis by cautery  The fascial layers were bluntly and sharply dissected down to the blue dome cyst which was then bluntly dissected out of the right hemiscrotum brought through the incision  The hydrocele was then opened and drained specimen sent the sac was then evaginated around the epididymis and a bottle capping technique with 2-0 chromic the sac was imbricated completely around the epididymis  Care was taken for hemostasis by cautery and 2-0 chromic ties    The testicle was then checked for position in dropped down into normal anatomical position quarter-inch drain placed blood out of the lateral aspect of the wound the scrotal fascia was closed with a running 2-0 chromic skin was closed with 2-0 interrupted sutures 2 0 state chromic sutures placed into the drain scrotal support placed postop procedure awakened taken recovery stable condition   I was present for the entire procedure    Patient Disposition:  PACU       SIGNATURE: Davey Olivarez MD  DATE: March 31, 2022  TIME: 9:33 AM

## 2022-03-31 NOTE — ANESTHESIA POSTPROCEDURE EVALUATION
Post-Op Assessment Note    CV Status:  Stable  Pain Score: 0    Pain management: adequate     Mental Status:  Awake   Hydration Status:  Stable   PONV Controlled:  None   Airway Patency:  Patent      Post Op Vitals Reviewed: Yes      Staff: CRNA         No complications documented      BP      Temp (P) 97 9 °F (36 6 °C) (03/31/22 0926)    Pulse     Resp (P) 12 (03/31/22 0926)    SpO2   98

## 2023-03-17 ENCOUNTER — OFFICE VISIT (OUTPATIENT)
Dept: OBGYN CLINIC | Facility: OTHER | Age: 68
End: 2023-03-17

## 2023-03-17 ENCOUNTER — APPOINTMENT (OUTPATIENT)
Dept: RADIOLOGY | Facility: OTHER | Age: 68
End: 2023-03-17

## 2023-03-17 VITALS
BODY MASS INDEX: 30.49 KG/M2 | WEIGHT: 213 LBS | DIASTOLIC BLOOD PRESSURE: 71 MMHG | HEIGHT: 70 IN | SYSTOLIC BLOOD PRESSURE: 163 MMHG | HEART RATE: 73 BPM

## 2023-03-17 DIAGNOSIS — M54.50 CHRONIC MIDLINE LOW BACK PAIN, UNSPECIFIED WHETHER SCIATICA PRESENT: Primary | ICD-10-CM

## 2023-03-17 DIAGNOSIS — M54.50 CHRONIC MIDLINE LOW BACK PAIN, UNSPECIFIED WHETHER SCIATICA PRESENT: ICD-10-CM

## 2023-03-17 DIAGNOSIS — G89.29 CHRONIC MIDLINE LOW BACK PAIN, UNSPECIFIED WHETHER SCIATICA PRESENT: Primary | ICD-10-CM

## 2023-03-17 DIAGNOSIS — G89.29 CHRONIC MIDLINE LOW BACK PAIN, UNSPECIFIED WHETHER SCIATICA PRESENT: ICD-10-CM

## 2023-03-17 RX ORDER — NAPROXEN SODIUM 220 MG
220 TABLET ORAL 2 TIMES DAILY WITH MEALS
Qty: 20 TABLET | Refills: 0 | Status: SHIPPED | OUTPATIENT
Start: 2023-03-17

## 2023-03-17 RX ORDER — METHYLPREDNISOLONE 4 MG/1
TABLET ORAL
Qty: 21 TABLET | Refills: 0 | Status: SHIPPED | OUTPATIENT
Start: 2023-03-17

## 2023-03-17 RX ORDER — SENNOSIDES 8.6 MG
650 CAPSULE ORAL EVERY 8 HOURS PRN
Qty: 30 TABLET | Refills: 0 | Status: SHIPPED | OUTPATIENT
Start: 2023-03-17

## 2023-03-17 RX ORDER — METHOCARBAMOL 500 MG/1
500 TABLET, FILM COATED ORAL
Qty: 10 TABLET | Refills: 0 | Status: SHIPPED | OUTPATIENT
Start: 2023-03-17

## 2023-03-18 NOTE — PROGRESS NOTES
70 y o  male presenting to the office for evaluation of bilateral back pain  Patient notes that he has had ongoing pain for the past couple of years, noting that it fluctuates in its intensity  Patient states that he can do his activities regularly, however, the following day he will have significant increase in pain  Patient states that he does have intermittent numbness and tingling going down his legs bilaterally  Patient is not sure if some of the numbness is from his diabetes, however, states that it is not continuous  Patient has not had any invention at this time  He denies any true injuries  He denies any other acute or associated complaints  ROS  Review of Systems   All other systems reviewed and are negative  Past Medical History:   Diagnosis Date   • Diabetes mellitus ([facility] [address] )    • Gout    • High blood pressure    • Hyperlipidemia    • Hypertension    • Sigmoid diverticulosis 12/03/2015     Past Surgical History:   Procedure Laterality Date   • HYDROCELE EXCISION / REPAIR     • PA EXCISION HYDROCELE UNILATERAL Right 3/31/2022    Procedure: HYDROCELECTOMY;  Surgeon: Derrell Cazares MD;  Location: [address] [place] [address];  Service: Urology     Results Reviewed     None          Physical Exam  Physical Exam  Constitutional:       Appearance: He is well-developed  HENT:      Head: Normocephalic and atraumatic  Eyes:      Pupils: Pupils are equal, round, and reactive to light  Neck:      Trachea: No tracheal deviation  Cardiovascular:      Rate and Rhythm: Normal rate and regular rhythm  Pulmonary:      Effort: Pulmonary effort is normal       Breath sounds: Normal breath sounds  Abdominal:      Tenderness: There is no guarding  Musculoskeletal:      Cervical back: Neck supple  Skin:     General: Skin is warm and dry  Neurological:      Mental Status: He is alert and oriented to person, place, and time     Psychiatric:         Behavior: Behavior normal        Back Exam     Tenderness   Back tenderness location: Diffuse discomfort over the lumbar spine and associated musculature  Muscle Strength   The patient has normal back strength  Other   Sensation: normal  Gait: abnormal           Neurologic Exam     Mental Status   Oriented to person, place, and time  Cranial Nerves     CN III, IV, VI   Pupils are equal, round, and reactive to light  Imaging  I personally reviewed these images :  Patient has significant degenerative changes noted in his lumbar spine at multiple levels  There is significant osteophyte formation along the entire lumbar spine  Patient does have significant loss of disc height at L1-2, L2-3, L4-5, L5-S1  There does not appear to be any significant spondylolisthesis  Procedures  none    Assessment/Plan  70 y o  male    1  Chronic midline low back pain, unspecified whether sciatica present  - XR spine lumbar 2 or 3 views injury; Future  - acetaminophen (TYLENOL) 650 mg CR tablet; Take 1 tablet (650 mg total) by mouth every 8 (eight) hours as needed for mild pain  Dispense: 30 tablet; Refill: 0  - naproxen sodium (ALEVE) 220 MG tablet; Take 1 tablet (220 mg total) by mouth 2 (two) times a day with meals  Dispense: 20 tablet; Refill: 0  - methocarbamol (ROBAXIN) 500 mg tablet; Take 1 tablet (500 mg total) by mouth daily at bedtime as needed for muscle spasms  Dispense: 10 tablet; Refill: 0  - methylPREDNISolone 4 MG tablet therapy pack; Use as directed on package  Dispense: 21 tablet; Refill: 0  - Ambulatory Referral to Physical Therapy; Future    Discussed with patient he would benefit from physical therapy at this time  Discussed with patient that medication regimen is to help get him through his physical therapy discussed with patient that due to his significant degenerative nature, he may benefit from evaluation from spine and pain as he will likely require intervention at some point    Did discuss brief overview with patient regarding operative and nonoperative intervention options  Discussed with patient that he would need to start with physical therapy prior to an MRI  Discussed with patient that if he is not noting any improvement in his symptomatology in 6 to 8 weeks, he should follow-up with spine and pain process for MRI evaluation  Discussed with patient that spine pain follow-up is appropriate, as they can provide nonoperative spine intervention if he requires any further modalities

## 2023-03-31 ENCOUNTER — EVALUATION (OUTPATIENT)
Dept: PHYSICAL THERAPY | Facility: CLINIC | Age: 68
End: 2023-03-31

## 2023-03-31 DIAGNOSIS — M54.50 CHRONIC MIDLINE LOW BACK PAIN, UNSPECIFIED WHETHER SCIATICA PRESENT: Primary | ICD-10-CM

## 2023-03-31 DIAGNOSIS — G89.29 CHRONIC MIDLINE LOW BACK PAIN, UNSPECIFIED WHETHER SCIATICA PRESENT: Primary | ICD-10-CM
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
## 2023-04-03 ENCOUNTER — OFFICE VISIT (OUTPATIENT)
Dept: PHYSICAL THERAPY | Facility: CLINIC | Age: 68
End: 2023-04-03

## 2023-04-03 DIAGNOSIS — M54.50 CHRONIC MIDLINE LOW BACK PAIN, UNSPECIFIED WHETHER SCIATICA PRESENT: Primary | ICD-10-CM

## 2023-04-03 DIAGNOSIS — G89.29 CHRONIC MIDLINE LOW BACK PAIN, UNSPECIFIED WHETHER SCIATICA PRESENT: Primary | ICD-10-CM

## 2023-04-03 NOTE — PROGRESS NOTES
Daily Note     Today's date: 4/3/2023  Patient name: Cecy Wheeler  : 1955  MRN: [id number]  Referring provider: Miller Vo*  Dx:   Encounter Diagnosis     ICD-10-CM    1  Chronic midline low back pain, unspecified whether sciatica present  M54 50     G89 29           Start Time: 1130  Stop Time: 1215  Total time in clinic (min): 45 minutes    Subjective: Patient stated that he was sore in his back and right leg on Saturday  He was barely able to walk  Objective: See treatment diary below      Assessment: continue with outlined program  Educated patient on how to perform FF in chair for HEP  Added new exercises as noted below  Patient was able to perform all exercises below without an onset of symptoms  Patient had a difficult time at first performing TA due to lack of coordination  He required cues to breath throughout the exercise  Educated patient to ice if he is sore later on tonight for 10 minutes and to wait 2 hours in between icing again  Gave patient HEP as noted below  Plan: Progress treatment as tolerated         Diagnosis: Chronic LBP   Precautions: HTN, diabetes   Primary Goals: 1) Flx preference low back pain  2) Tightness in hip flexors and quads  3) Weakness in core and multifidus   *asterisks by exercise = given for HEP   Manuals 3/31 4/3                                              There Ex        Bike  5 min      Hip flexor S  30 sec x 3 ea      Quad S        LTR*  10 sec x 10       FF in chair*  PB 3 way roll out 10xea       SKTC  5 sec x 10 ea      DKTC  3x30 sec       HS S *  30 sec x 3ea              Neuro Re-Ed        TA  2 min       Kegel  2 min       Mult with amps        Mult press        Mult walk outs        X walks        David Reasoner carries                                                 Re-evaluation              Ther Act                                         Modalities

## 2023-04-04 ENCOUNTER — OFFICE VISIT (OUTPATIENT)
Dept: PHYSICAL THERAPY | Facility: CLINIC | Age: 68
End: 2023-04-04

## 2023-04-04 DIAGNOSIS — M54.50 CHRONIC MIDLINE LOW BACK PAIN, UNSPECIFIED WHETHER SCIATICA PRESENT: Primary | ICD-10-CM

## 2023-04-04 DIAGNOSIS — G89.29 CHRONIC MIDLINE LOW BACK PAIN, UNSPECIFIED WHETHER SCIATICA PRESENT: Primary | ICD-10-CM

## 2023-04-04 NOTE — PROGRESS NOTES
Daily Note     Today's date: 2023  Patient name: Michelle Gillespie  : 1955  MRN: [id number]  Referring provider: Marilyn Muniz*  Dx:   Encounter Diagnosis     ICD-10-CM    1  Chronic midline low back pain, unspecified whether sciatica present  M54 50     G89 29           Start Time: 1000  Stop Time: 1045  Total time in clinic (min): 45 minutes    Subjective: Patient states that he feels fine today  Objective: See treatment diary below      Assessment: Continue with outlined program  Patient needs reminders to count his reps throughout his exercises  Patient did need a refresher on how to do some of his exercises  The patient did have some discomfort with the hip flexor stretch but following modification pain dissipated  Patient needed cues to breathe when performing both kegels and TA  He states he is a little sore following session  He has good understanding of his HEP  Plan: Progress treatment as tolerated         Diagnosis: Chronic LBP   Precautions: HTN, diabetes   Primary Goals: 1) Flx preference low back pain  2) Tightness in hip flexors and quads  3) Weakness in core and multifidus   *asterisks by exercise = given for HEP   Manuals 3/31 4/3 4/4                                             There Ex        Bike  5 min 5 min     Hip flexor S  30 sec x 3 ea 30 sec x 3ea     Quad S        LTR*  10 sec x 10  10 sec x 10     FF in chair*  PB 3 way roll out 10xea  PB 3 way roll out 10xea      SKTC  5 sec x 10 ea 30 sec x 3ea     DKTC  3x30 sec  30 sec x 3      HS S *  30 sec x 3ea 30 sec x 3 ea             Neuro Re-Ed        TA  2 min  2 min     Kegel  2 min  2 min      Mult with amps*   10 sec x10     Mult press        Mult walk outs        X walks        Thornton Dayton carries                                                 Re-evaluation              Ther Act                                         Modalities                                                     This patient was treated by STACIE SullivanA under the direct supervision of Lien Keys PTA

## 2023-04-24 ENCOUNTER — OFFICE VISIT (OUTPATIENT)
Dept: PHYSICAL THERAPY | Facility: CLINIC | Age: 68
End: 2023-04-24

## 2023-04-24 DIAGNOSIS — G89.29 CHRONIC MIDLINE LOW BACK PAIN, UNSPECIFIED WHETHER SCIATICA PRESENT: Primary | ICD-10-CM

## 2023-04-24 DIAGNOSIS — M54.50 CHRONIC MIDLINE LOW BACK PAIN, UNSPECIFIED WHETHER SCIATICA PRESENT: Primary | ICD-10-CM

## 2023-04-24 NOTE — PROGRESS NOTES
Daily Note     Today's date: 2023  Patient name: Franc Magallanes  : 1955  MRN: [id number]  Referring provider: Benita Manning*  Dx:   Encounter Diagnosis     ICD-10-CM    1  Chronic midline low back pain, unspecified whether sciatica present  M54 50     G89 29                      Subjective: Patient states that he doesn't feel any better and he feels like therapy isn't helping  Objective: See treatment diary below      Assessment: Continued with outlined program  Patient was able to do medicine ball press without any increase in pain  Reviewed log roll with patient and cued patient to keep his core tight  Educated patient on how the log roll with help with back pain  Patient was able to perform bridges and clamshells without any increase in pain  Once we were done with the table exercises had patient log roll to come off the table  Patient was able to perform mike exercises with good form and no increase in pain  Plan: Progress treatment as tolerated  Diagnosis: Chronic LBP   Precautions: HTN, diabetes   Primary Goals: 1) Flx preference low back pain  2) Tightness in hip flexors and quads  3) Weakness in core and multifidus   *asterisks by exercise = given for HEP   Manuals                                            There Ex        Bike  5 min 5 min RPE 4 5 min RPE 6/10 5 mins RPE 4/10   Hip flexor S Standing 30 sec x 3 ea   30 sec x 3ea  30 sec x 3ea   Quad S        LTR* 10 sec x 10  5 sec x 10 5 sec x 10  10 sec x 10  10 sec x 10    FF in chair* PB 3 way roll out 10xea PB 3 way roll out 10xea PB 3 way roll out 10x ea  PB 3 way roll out 10x ea  PB 3 way roll out 10x ea    SKTC  5 sec x 10 ea 5 sec x 10 ea  30 sec x 3 ea    DKTC 30 sec x 3 ea   30 sec x 3 30 sec x 3ea   HS S * 30 sec x 3 ea   30 sec x 3 ea 30 sec x 3 ea    Piriformis S  p!       Neuro Re-Ed        TA    2 min 2 min    Kegel        Mult with amps*    10 sec x 10 with biofeedback Mult press RMB 2 x 10  BMB 2x10 forwards no back rest BMB 2 x10 forwards   RMB 2 x10    Mult walk outs 5# x 10  5# x 10  5# x 15  5# x10    X walks     NV   Shelby Ny carries        Lateral side steps at mirror RTB 4 laps RTB 4 laps  RTB 2 laps RTB 4 laps    Seated marches with TA  unsupported x 10 ea        bridges X 10  2x10 2x10     clams  2x10 2x10              Re-evaluation           Ther Act           Bed mobility  Log roll x 3 X 3 log roll                  Modalities                                             This patient was treated by KATELYNN Vásquez under the direct supervision of Christopher Benjamin PTA

## 2023-04-26 ENCOUNTER — OFFICE VISIT (OUTPATIENT)
Dept: PHYSICAL THERAPY | Facility: CLINIC | Age: 68
End: 2023-04-26

## 2023-04-26 DIAGNOSIS — G89.29 CHRONIC MIDLINE LOW BACK PAIN, UNSPECIFIED WHETHER SCIATICA PRESENT: Primary | ICD-10-CM

## 2023-04-26 DIAGNOSIS — M54.50 CHRONIC MIDLINE LOW BACK PAIN, UNSPECIFIED WHETHER SCIATICA PRESENT: Primary | ICD-10-CM

## 2023-05-01 ENCOUNTER — EVALUATION (OUTPATIENT)
Dept: PHYSICAL THERAPY | Facility: CLINIC | Age: 68
End: 2023-05-01

## 2023-05-01 DIAGNOSIS — G89.29 CHRONIC MIDLINE LOW BACK PAIN, UNSPECIFIED WHETHER SCIATICA PRESENT: Primary | ICD-10-CM

## 2023-05-01 DIAGNOSIS — M54.50 CHRONIC MIDLINE LOW BACK PAIN, UNSPECIFIED WHETHER SCIATICA PRESENT: Primary | ICD-10-CM
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
## 2023-05-02 DIAGNOSIS — M54.50 CHRONIC MIDLINE LOW BACK PAIN, UNSPECIFIED WHETHER SCIATICA PRESENT: Primary | ICD-10-CM

## 2023-05-02 DIAGNOSIS — G89.29 CHRONIC MIDLINE LOW BACK PAIN, UNSPECIFIED WHETHER SCIATICA PRESENT: Primary | ICD-10-CM

## 2023-05-02 NOTE — PROGRESS NOTES
Patient has not had improvement with physical therapy  Will place the order for MRI   Patient to follow up with spine and pain after completion of the MRI

## 2023-05-03 ENCOUNTER — APPOINTMENT (OUTPATIENT)
Dept: PHYSICAL THERAPY | Facility: CLINIC | Age: 68
End: 2023-05-03
Payer: COMMERCIAL

## 2023-05-05 ENCOUNTER — TELEPHONE (OUTPATIENT)
Dept: OBGYN CLINIC | Facility: HOSPITAL | Age: 68
End: 2023-05-05

## 2023-05-05 NOTE — TELEPHONE ENCOUNTER
----- Message from Mary Gu PA-C sent at 5/2/2023 11:29 AM EDT -----  Regarding: FW: PT  Can you get this patient scheduled for an MRI of the lumbar spine with a follow up to spine and pain? Thanks!!  Ignacio Santiago  ----- Message -----  From: Juan Romero PT  Sent: 5/1/2023  10:33 AM EDT  To: Mary Gu PA-C  Subject: PT                                               Ignacio Santiago,    I have been seeing Emelykel Juan for a month for PT  Unfortunately, he has not made significant progress at this time  He is admittedly very inconsistent with HEP and activity modification  I discussed next steps with him including returning to you and a possible referral to pain management, depending on your recommendations   Thank you for the referral     Mary Mcduffie

## 2023-05-22 ENCOUNTER — HOSPITAL ENCOUNTER (OUTPATIENT)
Dept: MRI IMAGING | Facility: HOSPITAL | Age: 68
Discharge: HOME | End: 2023-05-22

## 2023-05-22 DIAGNOSIS — G89.29 CHRONIC MIDLINE LOW BACK PAIN, UNSPECIFIED WHETHER SCIATICA PRESENT: ICD-10-CM

## 2023-05-22 DIAGNOSIS — M54.50 CHRONIC MIDLINE LOW BACK PAIN, UNSPECIFIED WHETHER SCIATICA PRESENT: ICD-10-CM

## 2023-06-05 NOTE — PROGRESS NOTES
"  Portions of the record may have been created with voice recognition software  Occasional wrong word or \"sound a like\" substitutions may have occurred due to the inherent limitations of voice recognition software  Read the chart carefully and recognize, using context, where substitutions have occurred  Assessment  1  Lumbar spondylosis    2  Chronic midline low back pain without sciatica    3  Lumbar facet arthropathy    4  Myofascial pain syndrome    5  Chronic pain syndrome        Plan  No orders of the defined types were placed in this encounter  New Medications Ordered This Visit   Medications   • buPROPion (WELLBUTRIN XL) 150 mg 24 hr tablet     Sig: Take 150 mg by mouth daily   • latanoprost (XALATAN) 0 005 % ophthalmic solution       70year-old gentleman presenting with acute flareup of chronic low back pain for more than 10 years without any injury or accident  Localized axial low back pain with positive facet loading on physical exam no gross motor or sensory deficits of the lower extremities  Recent lumbar x-ray showing facet arthrosis and degenerative changes in the lumbar MRI showing L3-L4 left foraminal narrowing, L4-L5 right  Patient presentation at this time appears more consistent with lumbar spondylosis and facet arthropathy with likely elements of lumbar radiculitis in setting of foraminal narrowing   -Discussed treatment options including lumbar medial branch block and RFA  Discussed the risk and benefits including infection, bleeding, nerve injury, headaches, minimal improvement, need for multiple injections and patient wishes to proceed at this time  Plan for bilateral LMBB L4-L5 and L5-S1  My impressions and treatment recommendations were discussed in detail with the patient who verbalized understanding and had no further questions  Discharge instructions were provided   I personally saw and examined the patient and I agree with the above discussed plan of " care       History of Present Illness    Monisha Valladares  is a 70 y o  male with past medical history of diabetes, gout, hypertension and chronic low back pain  Pt is referred to [facility] by Thuy Melgar*      Patient presents today with more than 10-year history of low back pain which recently flared up over the past few months  Without any injury or accidents  Today reporting moderate pain that is nearly constant and localized to bilateral lumbar paraspinal region with occasional radiating pain in the hip, right side worse than left  Denies any radiating pain in the thighs or lower legs or feet or any associated numbness or weakness  Does not use any assist device for ambulation  Pain is increased with any activity including standing, bending, walking  Reports most stiffness and pain first thing in the morning and improves gradually throughout the day  In terms of management, physical therapy provided little relief  No relief with heat ice  Has taken Tylenol, ibuprofen in the past without relief  Indomethacin that he takes for gout provided some relief  Muscle relaxers including Robaxin did not help  Has not had any previous spine surgery or injections    Recent lumbar x-ray showing multilevel degenerative changes of the lumbar spine  No acute fractures  Lumbar MRI showing left-sided disc herniation at L3-L4 with moderate left foraminal narrowing and a right L 4 and L5 disc osteophyte causing moderate right foraminal narrowing  Mild central stenosis  he denies any bowel bladder evidence of saddle anesthesia  Denies any recent fevers chills, imbalance or falls  Lab Results   Component Value Date    CREATININE 1 60 (H) 03/28/2022     Lab Results   Component Value Date    ALT 28 05/13/2017         Imaging:      MRI LUMBAR SPINE WITHOUT CONTRAST   5-     INDICATION: M54 50: Low back pain, unspecified  G89 29: Other chronic pain     No improvement with physical therapy      COMPARISON: 3/17/2023 radiographs     TECHNIQUE: Multiplanar, multisequence imaging of the lumbar spine was performed  INTRAVENOUS CONTRAST:     IMAGE QUALITY:  Diagnostic     FINDINGS:     5 nonrib-bearing vertebral bodies on the prior radiographs  Vertebral bodies numbered so that the first conical shape vertebral segment is called S1  Iliolumbar ligaments at L5      VERTEBRAL BODIES:  Alignment: Similar mildly straightened lordosis and scoliosis  No spondylolisthesis      Vertebral body heights: Preserved      Bone marrow: Near-diffuse vascular and sclerotic degenerative endplate changes  No suspicious lumbar marrow edema or mass      SACRUM:  1 1 cm circumscribed left S2 2 marrow lesion with preserved areas of internal fat and no aggressive features, most compatible with hemangioma      No no suspicious marrow edema or fracture      DISTAL CORD AND CONUS:  Conus and nerve roots are within normal limits  Conus terminates at L1-L2      PARASPINAL SOFT TISSUES: Mild posterior paraspinal muscle fatty infiltration without loss of bulk      LOWER THORACIC DISC SPACES: Desiccation and mild loss of height at T10-T11 and T11-T12 with small disc bulges and mild central canal narrowing      LUMBAR DISC SPACES: Diffuse desiccation and mild to moderate loss of disc height with vacuum disc      L1-L2: Disc bulge  Mild central canal and foraminal narrowing      L2-L3: Disc bulge  Mild central canal and foraminal narrowing      L3-L4: Disc bulge  Left foraminal/extraforaminal disc protrusion  Mild central canal and right foraminal and moderate left foraminal narrowing      L4-L5: Disc bulge and small right foraminal/extraforaminal disc osteophyte  Mild facet hypertrophy  Mild central canal and left foraminal and moderate right foraminal narrowing      L5-S1: Disc bulge and mild facet hypertrophy   Minimal central canal and mild bilateral foraminal narrowing      IMPRESSION:     Diffuse disc bulges and mild lower lumbar facet osteoarthritis  Predominantly mild stenosis as detailed above      Left-sided disc herniation at L3-L4 with moderate left foraminal narrowing and asymmetric right L4-L5 disc osteophyte causing moderate right foraminal narrowing  LUMBAR SPINE 3-       INDICATION:   M54 50: Low back pain, unspecified  G89 29: Other chronic pain        COMPARISON:  None     VIEWS:  XR SPINE LUMBAR 2 OR 3 VIEWS INJURY  Images: 2     FINDINGS:     There are 5 non rib bearing lumbar vertebral bodies       There is no evidence of acute fracture or destructive osseous lesion      Mild scoliotic deformity is noted  Alignment is otherwise unremarkable       Intervertebral disc space narrowing at all levels with endplate osteophytes and facet arthropathy      The pedicles appear intact       Vascular calcifications      IMPRESSION:     No MRI cervical spine results found in the past 2 years   MRI lumbar spine wo contrast    Result Date: 5/26/2023  Impression     Diffuse disc bulges and mild lower lumbar facet osteoarthritis  Predominantly mild stenosis as detailed above  Left-sided disc herniation at L3-L4 with moderate left foraminal narrowing and asymmetric right L4-L5 disc osteophyte causing moderate right foraminal narrowing  Workstation performed: [id number]          No MRI thoracic spine results found in the past 2 years   No X-ray cervical spine results found in the past 2 years   XR spine lumbar 2 or 3 views injury    Result Date: 3/23/2023  Impression     Multilevel degenerative changes of lumbar spine  Workstation performed: [id number]          No X-ray hip/pelvis results found in the past 2 years   No X-ray knee results found in the past 2 years         I have personally reviewed and/or updated the patient's past medical history, past surgical history, family history, social history, current medications, allergies, and vital signs today       Review of Systems   Musculoskeletal: Positive for back pain, gait problem and joint swelling  Both hips and both thighs   Psychiatric/Behavioral: The patient is nervous/anxious          Patient Active Problem List   Diagnosis   • HTN (hypertension)   • Hyperlipidemia   • Diabetes mellitus, type 2 ([address] )   • Gout       Past Medical History:   Diagnosis Date   • [facility] )    • Gout    • High blood pressure    • Hyperlipidemia    • Hypertension    • Sigmoid diverticulosis 12/03/2015       Past Surgical History:   Procedure Laterality Date   • HYDROCELE EXCISION / REPAIR     • [place] EXCISION HYDROCELE UNILATERAL Right 3/31/2022    Procedure: HYDROCELECTOMY;  Surgeon: Irma Reeves MD;  Location: [address] [place] [address];  Service: Urology       Family History   Problem Relation Age of Onset   • Colon cancer Mother    • Uterine cancer Mother    • Heart attack Father    • Breast cancer Sister        Social History     Occupational History   • Not on file   Tobacco Use   • Smoking status: Former     Packs/day: 1 00     Years: 18 00     Total pack years: 18 00     Types: Cigarettes   • Smokeless tobacco: Never   Substance and Sexual Activity   • Alcohol use: No   • Drug use: No   • Sexual activity: Yes       Current Outpatient Medications on File Prior to Visit   Medication Sig   • acetaminophen (TYLENOL) 650 mg CR tablet Take 1 tablet (650 mg total) by mouth every 8 (eight) hours as needed for mild pain   • amLODIPine (NORVASC) 10 mg tablet Take 10 mg by mouth daily   • indomethacin (INDOCIN) 50 mg capsule Take 50 mg by mouth 2 (two) times a day with meals   • latanoprost (XALATAN) 0 005 % ophthalmic solution    • lisinopril (ZESTRIL) 20 mg tablet Take 20 mg by mouth 2 (two) times a day   • naproxen sodium (ALEVE) 220 MG tablet Take 1 tablet (220 mg total) by mouth 2 (two) times a day with meals   • pantoprazole (PROTONIX) 40 mg tablet Take 40 mg by mouth 2 (two) times a day   • simvastatin (ZOCOR) 10 mg tablet Take 10 mg by mouth daily at bedtime   • "sitaGLIPtin-metFORMIN (JANUMET)  MG per tablet Take 1 tablet by mouth 2 (two) times a day with meals   • buPROPion (WELLBUTRIN XL) 150 mg 24 hr tablet Take 150 mg by mouth daily (Patient not taking: Reported on 6/9/2023)   • methocarbamol (ROBAXIN) 500 mg tablet Take 1 tablet (500 mg total) by mouth daily at bedtime as needed for muscle spasms (Patient not taking: Reported on 6/9/2023)   • methylPREDNISolone 4 MG tablet therapy pack Use as directed on package (Patient not taking: Reported on 6/9/2023)   • sertraline (ZOLOFT) 50 mg tablet Take 50 mg by mouth daily (Patient not taking: Reported on 6/9/2023)     No current facility-administered medications on file prior to visit  No Known Allergies    Physical Exam    /78   Pulse 72   Ht 5' 10\" (1 778 m) Comment: verbal  Wt 96 6 kg (213 lb)   BMI 30 56 kg/m²     Constitutional: normal, well developed, well nourished, alert, in no distress and non-toxic and no overt pain behavior  Eyes: anicteric  HEENT: grossly intact  Neck: supple, symmetric, trachea midline and no masses   Pulmonary:even and unlabored  Cardiovascular:No edema or pitting edema present  Skin:Normal without rashes or lesions and well hydrated  Psychiatric:Mood and affect appropriate  Neurologic:Cranial Nerves II-XII grossly intact  Musculoskeletal:normal      Palpation:    Moderate tenderness over the left paravertebral musculature  Moderate tenderness over the right paravertebral musculature    No tenderness with palpation of the left SI joint  No tenderness with palpation of the right SI joint    No tenderness with palpation of the left greater trochanter  No tenderness with palpation of the right greater trochanter    Sensory:    Intact to light touch grossly in the left lower extremity   Intact to light touch grossly in the right lower extremity    Muscle Strength      Hip flexion Knee flexion Knee extension  L4 Foot plantarflexion  S1 Foot   Dorsiflexion  L5 " EHL-  Dorsiflexion  L5  EHL- Plantar Flexion-S1 Heel walking- L5 Toe walking   S1   L 5/5 5/5 5/5 5/5 5/5 5/5 5/5     R 5/5 5/5 5/5 5/5 5/5 5/5 5/5       DTRs: 2+ patellar, 2+ Achilles and symmetric       Special Tests:     Facet loading Straight leg raise DAGOBERTO FAIR   L  positive  negative  negative    R  positive  negative  negative

## 2023-06-09 ENCOUNTER — CONSULT (OUTPATIENT)
Dept: PAIN MEDICINE | Facility: CLINIC | Age: 68
End: 2023-06-09
Payer: COMMERCIAL

## 2023-06-09 VITALS
DIASTOLIC BLOOD PRESSURE: 78 MMHG | HEIGHT: 70 IN | SYSTOLIC BLOOD PRESSURE: 161 MMHG | HEART RATE: 72 BPM | BODY MASS INDEX: 30.49 KG/M2 | WEIGHT: 213 LBS

## 2023-06-09 DIAGNOSIS — M47.816 LUMBAR SPONDYLOSIS: Primary | ICD-10-CM

## 2023-06-09 DIAGNOSIS — M54.50 CHRONIC MIDLINE LOW BACK PAIN WITHOUT SCIATICA: ICD-10-CM

## 2023-06-09 DIAGNOSIS — G89.29 CHRONIC MIDLINE LOW BACK PAIN WITHOUT SCIATICA: ICD-10-CM

## 2023-06-09 DIAGNOSIS — M79.18 MYOFASCIAL PAIN SYNDROME: ICD-10-CM

## 2023-06-09 DIAGNOSIS — G89.4 CHRONIC PAIN SYNDROME: ICD-10-CM

## 2023-06-09 DIAGNOSIS — M47.816 LUMBAR FACET ARTHROPATHY: ICD-10-CM

## 2023-06-09 PROCEDURE — 99204 OFFICE O/P NEW MOD 45 MIN: CPT | Performed by: STUDENT IN AN ORGANIZED HEALTH CARE EDUCATION/TRAINING PROGRAM

## 2023-06-09 RX ORDER — LATANOPROST 50 UG/ML
SOLUTION/ DROPS OPHTHALMIC
COMMUNITY
Start: 2023-06-05

## 2023-06-09 RX ORDER — BUPROPION HYDROCHLORIDE 150 MG/1
150 TABLET ORAL DAILY
COMMUNITY
Start: 2023-04-26

## 2023-06-15 ENCOUNTER — TELEPHONE (OUTPATIENT)
Dept: OBGYN CLINIC | Facility: CLINIC | Age: 68
End: 2023-06-15

## 2023-06-15 ENCOUNTER — TELEPHONE (OUTPATIENT)
Dept: OBGYN CLINIC | Facility: MEDICAL CENTER | Age: 68
End: 2023-06-15

## 2023-06-15 NOTE — TELEPHONE ENCOUNTER
----- Message from Lyn Rosen DO sent at 6/9/2023 10:28 AM EDT -----  Bilateral LMBB L4-L5 and L5-S1   Thanks

## 2023-06-15 NOTE — TELEPHONE ENCOUNTER
Patient scheduled for LMBB  Patient's wife, Arabella Patel, aware of appt date and location as well as pre procedure instructions  Understanding verbalized

## 2023-06-27 ENCOUNTER — APPOINTMENT (OUTPATIENT)
Dept: RADIOLOGY | Facility: HOSPITAL | Age: 68
End: 2023-06-27
Payer: COMMERCIAL

## 2023-06-27 ENCOUNTER — HOSPITAL ENCOUNTER (OUTPATIENT)
Facility: HOSPITAL | Age: 68
Setting detail: OUTPATIENT SURGERY
Discharge: HOME | End: 2023-06-27
Attending: STUDENT IN AN ORGANIZED HEALTH CARE EDUCATION/TRAINING PROGRAM | Admitting: STUDENT IN AN ORGANIZED HEALTH CARE EDUCATION/TRAINING PROGRAM
Payer: COMMERCIAL

## 2023-06-27 VITALS
OXYGEN SATURATION: 96 % | TEMPERATURE: 98.3 F | HEART RATE: 66 BPM | SYSTOLIC BLOOD PRESSURE: 169 MMHG | DIASTOLIC BLOOD PRESSURE: 73 MMHG | RESPIRATION RATE: 18 BRPM

## 2023-06-27 PROCEDURE — 64494 INJ PARAVERT F JNT L/S 2 LEV: CPT | Performed by: STUDENT IN AN ORGANIZED HEALTH CARE EDUCATION/TRAINING PROGRAM

## 2023-06-27 PROCEDURE — 64493 INJ PARAVERT F JNT L/S 1 LEV: CPT | Performed by: STUDENT IN AN ORGANIZED HEALTH CARE EDUCATION/TRAINING PROGRAM

## 2023-06-27 RX ORDER — BUPIVACAINE HYDROCHLORIDE 2.5 MG/ML
INJECTION, SOLUTION EPIDURAL; INFILTRATION; INTRACAUDAL AS NEEDED
Status: DISCONTINUED | OUTPATIENT
Start: 2023-06-27 | End: 2023-06-27 | Stop reason: HOSPADM

## 2023-06-27 NOTE — DISCHARGE INSTRUCTIONS

## 2023-06-27 NOTE — OP NOTE
OPERATIVE REPORT  PATIENT NAME: Winsome Segovia  :  1955  MRN: [id number]  Pt Location: [facility] OR ROOM 01    SURGERY DATE: 2023    Surgeon(s) and Role:     * Naveed Cramer DO - Primary    Preop Diagnosis:  Lumbar spondylosis [M47 816]    Post-Op Diagnosis Codes:     * Lumbar spondylosis [M47 816]    Procedure(s):  Bilateral - L4-S1 MEDIAL BRANCH BLOCK (02120 [id number])    Specimen(s):  * No specimens in log *    Estimated Blood Loss:   Minimal    Drains:  Open Drain Right Other (Comment) (Active)   Number of days: 453       Anesthesia Type:   Local    Operative Indications:  Lumbar spondylosis [O84 489]      Operative Findings:      Complications:   None    Procedure and Technique:  DATE: 2023  PROCEDURE: bilateral Lumbar  L3, L4 Medial Branch and L5 Dorsal Ramus Block under Fluoroscopy-Diagnostic 1  PHYSICIAN: Naveed Olivas DO  PRE-OPERATIVE DIAGNOSIS: bilateral Lumbar Pain and Spondylosis  POST-OPERATIVE DIAGNOSIS: Same  ANESTHESIA: Local  COMPLICATIONS: None     Indications  Winsome Segovia  is a 70 y o  male with a history of chronic low back pain who has failed conservative therapy and presents today for diagnostic lumbar medial branch blocks to help with the pain  Informed Consent  The risks, benefits and alternatives of the procedure were discussed with the patient  The patient was given opportunity to ask questions regarding the procedure, its indications and the associated risks  The risks of the procedure discussed include but are not limited to infection, bleeding, allergic reactions, nerve injuries, worsened pain, paralysis, headache, susceptibility to COVID from steroids, and medication side effects  The patient showed understanding and wished to proceed  Informed consent was signed  Preparation  After obtaining informed consent, the patient's chart was reviewed, and the patient's identification was confirmed   The patient was brought to the exam room and placed in the prone position on the exam room table  A time out was performed per hospital policy  Strict sterile technique was used  Skin was prepped with chloraprep solution and draped in the usual manner  Standard monitors were placed  AP and oblique fluoroscopy were used to identify and kalee the junction of the sacral superior articular process and sacral ala on the LEFT side as well as the junction of the superior articular process and transverse process at the L4 and L5 vertebral level(s)  The overlying skin was anesthetized using a 25 gauge needle and 1% lidocaine  A 25 G 3 5 in needle was inserted and directed to the to the marked location  Once bone was contacted, negative aspiration was confirmed, and 1 cc of a solution containing 6 cc of 0 25% Bupivacaine was injected at each level  No paresthesias were elicited  The procedure was repeated on the right side    The patient was injected with a total of 6 cc 0 25% bupivacaine    The patient tolerated the procedure well and there were no immediate adverse events  The patient was instructed to call with fever, chills, increased pain, redness or swelling at the injection site, numbness or weakness in the leg      Patient Disposition:  PACU         SIGNATURE: Gerson Johnston DO  DATE: June 27, 2023  TIME: 11:00 AM

## 2023-07-25 ENCOUNTER — HOSPITAL ENCOUNTER (OUTPATIENT)
Dept: RADIOLOGY | Facility: HOSPITAL | Age: 68
Discharge: HOME | End: 2023-07-25
Attending: STUDENT IN AN ORGANIZED HEALTH CARE EDUCATION/TRAINING PROGRAM | Admitting: STUDENT IN AN ORGANIZED HEALTH CARE EDUCATION/TRAINING PROGRAM
Payer: COMMERCIAL

## 2023-07-25 VITALS
SYSTOLIC BLOOD PRESSURE: 159 MMHG | DIASTOLIC BLOOD PRESSURE: 72 MMHG | RESPIRATION RATE: 18 BRPM | HEART RATE: 64 BPM | OXYGEN SATURATION: 97 % | TEMPERATURE: 97.7 F

## 2023-07-25 DIAGNOSIS — M47.816 LUMBAR SPONDYLOSIS: ICD-10-CM

## 2023-07-25 PROCEDURE — 64493 INJ PARAVERT F JNT L/S 1 LEV: CPT | Performed by: STUDENT IN AN ORGANIZED HEALTH CARE EDUCATION/TRAINING PROGRAM

## 2023-07-25 PROCEDURE — 64494 INJ PARAVERT F JNT L/S 2 LEV: CPT | Performed by: STUDENT IN AN ORGANIZED HEALTH CARE EDUCATION/TRAINING PROGRAM

## 2023-07-25 RX ORDER — BUPIVACAINE HYDROCHLORIDE 5 MG/ML
6 INJECTION, SOLUTION EPIDURAL; INTRACAUDAL ONCE
Status: DISCONTINUED | OUTPATIENT
Start: 2023-07-25 | End: 2023-07-25

## 2023-07-25 RX ORDER — BUPIVACAINE HCL/PF 2.5 MG/ML
6 VIAL (ML) INJECTION ONCE
Status: COMPLETED | OUTPATIENT
Start: 2023-07-25 | End: 2023-07-25

## 2023-07-25 RX ADMIN — Medication 6 ML: at 09:20

## 2023-07-25 NOTE — DISCHARGE INSTR - LAB

## 2023-07-25 NOTE — H&P
History of Present Illness: The patient is a 70 y.o. male who presents with complaints of bilateral low back pain presenting for bilateral LMBB #2. Past Medical History:   Diagnosis Date   • Diabetes mellitus ([address])    • Gout    • High blood pressure    • Hyperlipidemia    • Hypertension    • Sigmoid diverticulosis 12/03/2015       Past Surgical History:   Procedure Laterality Date   • HYDROCELE EXCISION / REPAIR     • NERVE BLOCK Bilateral 6/27/2023    Procedure: L4-S1 MEDIAL BRANCH BLOCK ([id number]);   Surgeon: Jorge Luis Salas DO;  Location:  MAIN OR;  Service: Pain Management    • [place] EXCISION HYDROCELE UNILATERAL Right 3/31/2022    Procedure: HYDROCELECTOMY;  Surgeon: Frankie Downs MD;  Location: [facility];  Service: Urology         Current Outpatient Medications:   •  acetaminophen (TYLENOL) 650 mg CR tablet, Take 1 tablet (650 mg total) by mouth every 8 (eight) hours as needed for mild pain, Disp: 30 tablet, Rfl: 0  •  amLODIPine (NORVASC) 10 mg tablet, Take 10 mg by mouth daily, Disp: , Rfl:   •  buPROPion (WELLBUTRIN XL) 150 mg 24 hr tablet, Take 150 mg by mouth daily (Patient not taking: Reported on 6/9/2023), Disp: , Rfl:   •  indomethacin (INDOCIN) 50 mg capsule, Take 50 mg by mouth 2 (two) times a day with meals, Disp: , Rfl:   •  latanoprost (XALATAN) 0.005 % ophthalmic solution, , Disp: , Rfl:   •  lisinopril (ZESTRIL) 20 mg tablet, Take 20 mg by mouth 2 (two) times a day, Disp: , Rfl:   •  methocarbamol (ROBAXIN) 500 mg tablet, Take 1 tablet (500 mg total) by mouth daily at bedtime as needed for muscle spasms (Patient not taking: Reported on 6/9/2023), Disp: 10 tablet, Rfl: 0  •  methylPREDNISolone 4 MG tablet therapy pack, Use as directed on package (Patient not taking: Reported on 6/9/2023), Disp: 21 tablet, Rfl: 0  •  naproxen sodium (ALEVE) 220 MG tablet, Take 1 tablet (220 mg total) by mouth 2 (two) times a day with meals, Disp: 20 tablet, Rfl: 0  •  pantoprazole (PROTONIX) 40 mg tablet, Take 40 mg by mouth 2 (two) times a day, Disp: , Rfl:   •  sertraline (ZOLOFT) 50 mg tablet, Take 50 mg by mouth daily (Patient not taking: Reported on 6/9/2023), Disp: , Rfl:   •  simvastatin (ZOCOR) 10 mg tablet, Take 10 mg by mouth daily at bedtime, Disp: , Rfl:   •  sitaGLIPtin-metFORMIN (JANUMET)  MG per tablet, Take 1 tablet by mouth 2 (two) times a day with meals, Disp: , Rfl:     Current Facility-Administered Medications:   •  bupivacaine (PF) (MARCAINE) 0.25 % injection 6 mL, 6 mL, Epidural, Once, Naveed Olivas, DO    No Known Allergies    Physical Exam:   Vitals:    07/25/23 0908   BP: 166/79   Pulse: 63   Resp: 18   SpO2: 98%     General: Awake, Alert, Oriented x 3, Mood and affect appropriate  Respiratory: Respirations even and unlabored  Cardiovascular: Peripheral pulses intact; no edema  Musculoskeletal Exam: bilateral axial low back pain    ASA Score: 3    Patient/Chart Verification  Patient ID Verified: Verbal  ID Band Applied: No  Consents Confirmed: Procedural, To be obtained in the Pre-Procedure area  H&P( within 30 days) Verified: To be obtained in the Pre-Procedure area  Interval H&P(within 24 hr) Complete (required for Outpatients and Surgery Admit only): To be obtained in the Pre-Procedure area  Allergies Reviewed: Yes  Anticoag/NSAID held?: NA  Currently on antibiotics?: No    Assessment:   1.  Lumbar spondylosis        Plan: B/L L4-S1 MBB#2

## 2023-08-03 ENCOUNTER — TELEPHONE (OUTPATIENT)
Age: 68
End: 2023-08-03

## 2023-08-03 NOTE — TELEPHONE ENCOUNTER
Caller: patient spouse    Doctor: kings    Reason for call: would like to know when is his next procedure for never block    Call back#:

## 2023-08-03 NOTE — TELEPHONE ENCOUNTER
S/w pts wife as per SANDRO. Pts PD uploaded in [address] w/ favorable out come to proceed w/ RFA. Pts wife stated that pt had 0/10 pain directly following proc and 6/10 pain post proc day 1. RN advised HS currently OOO until Wednesday but will be sending task to proc schd for scheduling of RFA. Pts wife verbalized understanding and stated they will be driving today [place], returning next week. Pts wife apprec of c/b. Sched--> pls schd pt accordingly. Thank you!

## 2023-08-07 NOTE — TELEPHONE ENCOUNTER
Patient scheduled for procedure with , 8/15/23. Patient does not have mychart, so the following instructions were given to patient verbally, no vaccines 14 days prior or after procedure, nothing to eat or drink 1 hr prior to procedure, wear something loose and comfortable, no jewelry, if ill, infection or antibiotics, must call office to reschedule procedure. Take prescription medications as normal and you will need a  18 yrs or older. Patient denies taking any blood thinners, Aspirin or prescription.

## 2023-08-15 ENCOUNTER — HOSPITAL ENCOUNTER (OUTPATIENT)
Dept: RADIOLOGY | Facility: HOSPITAL | Age: 68
Discharge: HOME | End: 2023-08-15
Attending: STUDENT IN AN ORGANIZED HEALTH CARE EDUCATION/TRAINING PROGRAM | Admitting: STUDENT IN AN ORGANIZED HEALTH CARE EDUCATION/TRAINING PROGRAM
Payer: COMMERCIAL

## 2023-08-15 ENCOUNTER — TELEPHONE (OUTPATIENT)
Dept: PAIN MEDICINE | Facility: CLINIC | Age: 68
End: 2023-08-15

## 2023-08-15 VITALS
SYSTOLIC BLOOD PRESSURE: 156 MMHG | RESPIRATION RATE: 18 BRPM | TEMPERATURE: 97.7 F | DIASTOLIC BLOOD PRESSURE: 80 MMHG | HEART RATE: 66 BPM | OXYGEN SATURATION: 97 %

## 2023-08-15 DIAGNOSIS — M47.816 LUMBAR SPONDYLOSIS: ICD-10-CM

## 2023-08-15 PROCEDURE — 64636 DESTROY L/S FACET JNT ADDL: CPT | Performed by: STUDENT IN AN ORGANIZED HEALTH CARE EDUCATION/TRAINING PROGRAM

## 2023-08-15 PROCEDURE — 64635 DESTROY LUMB/SAC FACET JNT: CPT | Performed by: STUDENT IN AN ORGANIZED HEALTH CARE EDUCATION/TRAINING PROGRAM

## 2023-08-15 RX ORDER — METHYLPREDNISOLONE ACETATE 40 MG/ML
40 INJECTION, SUSPENSION INTRA-ARTICULAR; INTRALESIONAL; INTRAMUSCULAR; PARENTERAL; SOFT TISSUE ONCE
Status: COMPLETED | OUTPATIENT
Start: 2023-08-15 | End: 2023-08-15

## 2023-08-15 RX ORDER — BUPIVACAINE HCL/PF 2.5 MG/ML
2 VIAL (ML) INJECTION ONCE
Status: COMPLETED | OUTPATIENT
Start: 2023-08-15 | End: 2023-08-15

## 2023-08-15 RX ADMIN — METHYLPREDNISOLONE ACETATE 40 MG: 40 INJECTION, SUSPENSION INTRA-ARTICULAR; INTRALESIONAL; INTRAMUSCULAR; SOFT TISSUE at 08:58

## 2023-08-15 RX ADMIN — Medication 2 ML: at 08:58

## 2023-08-15 RX ADMIN — Medication 10 ML: at 08:58

## 2023-08-15 NOTE — TELEPHONE ENCOUNTER
Pt s/p Lt L4-S1 RFA on 8/15/23 HS. Pt needs to be schd for Rt L4-S1 RFA. Separate task sent to proc schd.       Pls c/b pt on 8/16/23

## 2023-08-15 NOTE — TELEPHONE ENCOUNTER
Pls reach out to pt for scheduling of Rt L4-S1 RFA. Pt had Lt completed today 8/16/23. Pls advise. Thank you!

## 2023-08-15 NOTE — DISCHARGE INSTR - LAB

## 2023-08-15 NOTE — H&P
History of Present Illness: The patient is a 70 y.o. male who presents with complaints of left lumbar paraspinal pain with plan for left L3,L4 and L5 medial branch RFA. Past Medical History:   Diagnosis Date   • Diabetes mellitus ([address])    • Gout    • High blood pressure    • Hyperlipidemia    • Hypertension    • Sigmoid diverticulosis 12/03/2015       Past Surgical History:   Procedure Laterality Date   • HYDROCELE EXCISION / REPAIR     • NERVE BLOCK Bilateral 6/27/2023    Procedure: L4-S1 MEDIAL BRANCH BLOCK ([id number]);   Surgeon: Joi Palencia DO;  Location:  MAIN OR;  Service: Pain Management    • [place] EXCISION HYDROCELE UNILATERAL Right 3/31/2022    Procedure: HYDROCELECTOMY;  Surgeon: Queta Tim MD;  Location: [facility];  Service: Urology         Current Outpatient Medications:   •  acetaminophen (TYLENOL) 650 mg CR tablet, Take 1 tablet (650 mg total) by mouth every 8 (eight) hours as needed for mild pain, Disp: 30 tablet, Rfl: 0  •  amLODIPine (NORVASC) 10 mg tablet, Take 10 mg by mouth daily, Disp: , Rfl:   •  buPROPion (WELLBUTRIN XL) 150 mg 24 hr tablet, Take 150 mg by mouth daily (Patient not taking: Reported on 6/9/2023), Disp: , Rfl:   •  indomethacin (INDOCIN) 50 mg capsule, Take 50 mg by mouth 2 (two) times a day with meals, Disp: , Rfl:   •  latanoprost (XALATAN) 0.005 % ophthalmic solution, , Disp: , Rfl:   •  lisinopril (ZESTRIL) 20 mg tablet, Take 20 mg by mouth 2 (two) times a day, Disp: , Rfl:   •  methocarbamol (ROBAXIN) 500 mg tablet, Take 1 tablet (500 mg total) by mouth daily at bedtime as needed for muscle spasms (Patient not taking: Reported on 6/9/2023), Disp: 10 tablet, Rfl: 0  •  methylPREDNISolone 4 MG tablet therapy pack, Use as directed on package (Patient not taking: Reported on 6/9/2023), Disp: 21 tablet, Rfl: 0  •  naproxen sodium (ALEVE) 220 MG tablet, Take 1 tablet (220 mg total) by mouth 2 (two) times a day with meals, Disp: 20 tablet, Rfl: 0  •  pantoprazole (PROTONIX) 40 mg tablet, Take 40 mg by mouth 2 (two) times a day, Disp: , Rfl:   •  sertraline (ZOLOFT) 50 mg tablet, Take 50 mg by mouth daily (Patient not taking: Reported on 6/9/2023), Disp: , Rfl:   •  simvastatin (ZOCOR) 10 mg tablet, Take 10 mg by mouth daily at bedtime, Disp: , Rfl:   •  sitaGLIPtin-metFORMIN (JANUMET)  MG per tablet, Take 1 tablet by mouth 2 (two) times a day with meals, Disp: , Rfl:   No current facility-administered medications for this encounter. No Known Allergies    Physical Exam:   Vitals:    08/15/23 0855   BP: 156/80   Pulse: 66   Resp: 18   Temp:    SpO2: 97%     General: Awake, Alert, Oriented x 3, Mood and affect appropriate  Respiratory: Respirations even and unlabored  Cardiovascular: Peripheral pulses intact; no edema  Musculoskeletal Exam: left facet loading lumbar positive, TTP lumbar paraspinal    ASA Score: 3    Patient/Chart Verification  Patient ID Verified: Verbal  ID Band Applied: No  Consents Confirmed: Procedural, To be obtained in the Pre-Procedure area  H&P( within 30 days) Verified: To be obtained in the Pre-Procedure area  Interval H&P(within 24 hr) Complete (required for Outpatients and Surgery Admit only): To be obtained in the Pre-Procedure area  Allergies Reviewed: Yes  Anticoag/NSAID held?: NA  Currently on antibiotics?: No    Assessment:   1.  Lumbar spondylosis        Plan: Lt L3, L4 RFA and L5 Dorsal Ramus RFA

## 2023-08-16 NOTE — TELEPHONE ENCOUNTER
S/W pt who states he is feeling good  Minor pain this am but none now  No S/S of infection, fever, sun burn  Aware of Rt side appt 9/5  Call with any issues or problems until then

## 2023-09-05 ENCOUNTER — TELEPHONE (OUTPATIENT)
Dept: PAIN MEDICINE | Facility: CLINIC | Age: 68
End: 2023-09-05

## 2023-09-05 ENCOUNTER — HOSPITAL ENCOUNTER (OUTPATIENT)
Dept: RADIOLOGY | Facility: HOSPITAL | Age: 68
Discharge: HOME | End: 2023-09-05
Attending: STUDENT IN AN ORGANIZED HEALTH CARE EDUCATION/TRAINING PROGRAM | Admitting: STUDENT IN AN ORGANIZED HEALTH CARE EDUCATION/TRAINING PROGRAM
Payer: COMMERCIAL

## 2023-09-05 VITALS
DIASTOLIC BLOOD PRESSURE: 77 MMHG | SYSTOLIC BLOOD PRESSURE: 175 MMHG | RESPIRATION RATE: 18 BRPM | OXYGEN SATURATION: 97 % | TEMPERATURE: 96.8 F | HEART RATE: 65 BPM

## 2023-09-05 DIAGNOSIS — M47.816 LUMBAR SPONDYLOSIS: ICD-10-CM

## 2023-09-05 PROCEDURE — 64636 DESTROY L/S FACET JNT ADDL: CPT | Performed by: STUDENT IN AN ORGANIZED HEALTH CARE EDUCATION/TRAINING PROGRAM

## 2023-09-05 PROCEDURE — 64635 DESTROY LUMB/SAC FACET JNT: CPT | Performed by: STUDENT IN AN ORGANIZED HEALTH CARE EDUCATION/TRAINING PROGRAM

## 2023-09-05 RX ORDER — BUPIVACAINE HCL/PF 2.5 MG/ML
2 VIAL (ML) INJECTION ONCE
Status: COMPLETED | OUTPATIENT
Start: 2023-09-05 | End: 2023-09-05

## 2023-09-05 RX ORDER — METHYLPREDNISOLONE ACETATE 40 MG/ML
40 INJECTION, SUSPENSION INTRA-ARTICULAR; INTRALESIONAL; INTRAMUSCULAR; PARENTERAL; SOFT TISSUE ONCE
Status: COMPLETED | OUTPATIENT
Start: 2023-09-05 | End: 2023-09-05

## 2023-09-05 RX ADMIN — Medication 10 ML: at 10:21

## 2023-09-05 RX ADMIN — Medication 2 ML: at 10:20

## 2023-09-05 RX ADMIN — METHYLPREDNISOLONE ACETATE 40 MG: 40 INJECTION, SUSPENSION INTRA-ARTICULAR; INTRALESIONAL; INTRAMUSCULAR; SOFT TISSUE at 10:21

## 2023-09-06 NOTE — TELEPHONE ENCOUNTER
FYI..    S/w pts wife (April Donovan) as per SANDRO. Pts wife stated needle sites look good, denies S/S of infect, denies fever, denies soreness and denies sun burn like sensation. Advised pts wife if pt has any further pain to take OTC/prescribed meds and/or use ice/heat and that it can take 4-6wks to see full effect. RN advised w/ departure of HS that pt is avail to make appt w/ any of our area pain specialists or f/u on a as needed basis if otherwise doing well. Pts wife verbalized understanding and apprec of call.
Pt s/p Rt L4-S1 on 9/5/23 HS    Pt can either make f/u appt w/ another provider in [address] [place] [address] or can call as needed if doing well. Pls c/b pt on 9/6/23.
No

## 2024-02-05 LAB — HBA1C MFR BLD HPLC: 7.4 %

## 2024-11-07 ENCOUNTER — OFFICE VISIT (OUTPATIENT)
Dept: NEPHROLOGY | Facility: CLINIC | Age: 69
End: 2024-11-07
Payer: COMMERCIAL

## 2024-11-07 VITALS
SYSTOLIC BLOOD PRESSURE: 178 MMHG | WEIGHT: 216 LBS | BODY MASS INDEX: 30.92 KG/M2 | DIASTOLIC BLOOD PRESSURE: 80 MMHG | HEIGHT: 70 IN | HEART RATE: 70 BPM

## 2024-11-07 DIAGNOSIS — I12.9 BENIGN HYPERTENSION WITH CKD (CHRONIC KIDNEY DISEASE) STAGE III (HCC): ICD-10-CM

## 2024-11-07 DIAGNOSIS — N18.30 BENIGN HYPERTENSION WITH CKD (CHRONIC KIDNEY DISEASE) STAGE III (HCC): ICD-10-CM

## 2024-11-07 DIAGNOSIS — N18.32 TYPE 2 DIABETES MELLITUS WITH STAGE 3B CHRONIC KIDNEY DISEASE, WITHOUT LONG-TERM CURRENT USE OF INSULIN (HCC): ICD-10-CM

## 2024-11-07 DIAGNOSIS — E87.5 HYPERKALEMIA: ICD-10-CM

## 2024-11-07 DIAGNOSIS — Z11.59 ENCOUNTER FOR HEPATITIS C SCREENING TEST FOR LOW RISK PATIENT: ICD-10-CM

## 2024-11-07 DIAGNOSIS — Z87.39 HISTORY OF GOUT: ICD-10-CM

## 2024-11-07 DIAGNOSIS — E11.22 TYPE 2 DIABETES MELLITUS WITH STAGE 3B CHRONIC KIDNEY DISEASE, WITHOUT LONG-TERM CURRENT USE OF INSULIN (HCC): ICD-10-CM

## 2024-11-07 DIAGNOSIS — N18.32 STAGE 3B CHRONIC KIDNEY DISEASE (HCC): Primary | ICD-10-CM

## 2024-11-07 LAB
SL AMB  POCT GLUCOSE, UA: ABNORMAL
SL AMB LEUKOCYTE ESTERASE,UA: ABNORMAL
SL AMB POCT BILIRUBIN,UA: ABNORMAL
SL AMB POCT BLOOD,UA: ABNORMAL
SL AMB POCT CLARITY,UA: CLEAR
SL AMB POCT COLOR,UA: YELLOW
SL AMB POCT KETONES,UA: ABNORMAL
SL AMB POCT NITRITE,UA: ABNORMAL
SL AMB POCT PH,UA: 5
SL AMB POCT SPECIFIC GRAVITY,UA: 1.02
SL AMB POCT URINE PROTEIN: 100
SL AMB POCT UROBILINOGEN: 0.2

## 2024-11-07 PROCEDURE — 99204 OFFICE O/P NEW MOD 45 MIN: CPT | Performed by: INTERNAL MEDICINE

## 2024-11-07 PROCEDURE — 81002 URINALYSIS NONAUTO W/O SCOPE: CPT | Performed by: INTERNAL MEDICINE

## 2024-11-07 RX ORDER — TAMSULOSIN HYDROCHLORIDE 0.4 MG/1
0.4 CAPSULE ORAL
COMMUNITY

## 2024-11-07 RX ORDER — LISINOPRIL 20 MG/1
30 TABLET ORAL DAILY
Qty: 135 TABLET | Refills: 0 | Status: SHIPPED | OUTPATIENT
Start: 2024-11-07 | End: 2025-02-05

## 2024-11-07 RX ORDER — CARVEDILOL 3.12 MG/1
3.12 TABLET ORAL 2 TIMES DAILY WITH MEALS
COMMUNITY

## 2024-11-07 RX ORDER — PIOGLITAZONEHYDROCHLORIDE 30 MG/1
30 TABLET ORAL DAILY
COMMUNITY

## 2024-11-07 RX ORDER — CHLORTHALIDONE 25 MG/1
12.5 TABLET ORAL DAILY
Qty: 45 TABLET | Refills: 3 | Status: SHIPPED | OUTPATIENT
Start: 2024-11-07 | End: 2025-02-05

## 2024-11-07 RX ORDER — BETAMETHASONE DIPROPIONATE 0.5 MG/G
CREAM TOPICAL 2 TIMES DAILY
COMMUNITY

## 2024-11-07 NOTE — ASSESSMENT & PLAN NOTE
Most recent hemoglobin A1c 7.4 02/2024 increased from 7.2 in 09/2022  Current Rx: Metformin 1000 mg 2 times a day, Actos 30 mg daily  Recommend stopping metformin as GFR is close to 30 and will increase risk for lactic acidosis  Reasonable to add DPP 4 inhibitor  Once tolerating diuretic, would be a good candidate for SGLT2 inhibitor

## 2024-11-07 NOTE — PATIENT INSTRUCTIONS
You were evaluated for kidney disease and high potassium.    We will do blood tests and urine test for further evaluation of your kidney problem.    We will do kidney ultrasound to rule out obstruction to the flow of urine.    Decrease lisinopril from twice a day to once a day.    Start chlorthalidone half tablet once a day.  We will do renal function panel blood test every 4 weeks to follow-up on the kidney function and potassium.    Focus on a low potassium diet and educational material was provided today.

## 2024-11-07 NOTE — PROGRESS NOTES
Ambulatory Visit  Name: Charly Reilly Jr.      : 1955      MRN: [id number]  Encounter Provider: Marcus Messer MD  Encounter Date: 2024   Encounter department: [facility] [place]    Assessment & Plan  Stage 3b chronic kidney disease (HCC)  - Etiology/risk factors: Longstanding history of hypertension, longstanding history of diabetes, age-related nephron loss  - Baseline Cr: 1.6 in , 2.1 in 2024, most recently 2.52 2024  - Urinalysis: Dipstick analysis positive for protein and blood in the office today  - Update renal function panel  - Check urinalysis with microscopy  - Check urine albumin to creatinine ratio and urine protein to creatinine ratio  - Check C3/C4/YASMANI/dsDNA  - Check ANCA/anti-GBM  - Check SPEP/UPEP/free light chain ratio  - Check hep B and hep C serology  - Check kidney ultrasound to rule out obstructive etiology  - Decrease lisinopril to 30 mg once daily due to ongoing hyperkalemia  - Start chlorthalidone 12.5 mg daily for better blood pressure control and kaliuresis  - Check renal function panel every 4 weeks  - Discussed risk factor reduction to slow progression of chronic kidney disease  Intensive blood pressure control as below  Glycemic control as below  Lipid control, on simvastatin per PCP    # Screening for Anemia   - Target Hb: More than 10 g/dL  - Most recent hemoglobin: 11.9 g/dL 2022    # CKD Mineral and Bone Disorder   - Goal Ca 8.5-10 mg/dL, goal Phos 2.7-4.6 mg/dL, goal iPTH 30-70 pg/mL  - Check 25 hydroxy vitamin-D and iPTH level  Benign hypertension with CKD (chronic kidney disease) stage III (HCC)  # Hypertension/Volume   - Goal BP <120/80 per KDIGO guidelines   - Volume status: Euvolemic  - Status: Blood pressure above goal  - Current antihypertensive regimen: Lisinopril 30 mg twice daily, amlodipine 10 mg daily, Flomax 0.4 mg daily, carvedilol 3.125 mg twice daily  - Changes: Decrease lisinopril to once daily dosing, add  chlorthalidone 12.5 mg daily  - Trend renal function panel every 4 weeks  Hyperkalemia  Chronic hyperkalemia dates back to 2022  Serum potassium 5.3 on 09/19/2022  Serum potassium 6.2 on 09/10/2024  Serum creatinine 5.6 on 09/16/2024  Most recent bicarbonate level low normal at 21  Etiology most likely type IV RTA in setting of diabetes +/- CKD +/- RAAS blockade  Rule out obstructive etiology with kidney ultrasound as above  Decrease lisinopril as above  Add chlorthalidone as above  Low potassium diet discussed  Samples for Veltassa given to be used as needed  Type 2 diabetes mellitus with stage 3b chronic kidney disease, without long-term current use of insulin (HCC)  Most recent hemoglobin A1c 7.4 02/2024 increased from 7.2 in 09/2022  Current Rx: Metformin 1000 mg 2 times a day, Actos 30 mg daily  Recommend stopping metformin as GFR is close to 30 and will increase risk for lactic acidosis  Reasonable to add DPP 4 inhibitor  Once tolerating diuretic, would be a good candidate for SGLT2 inhibitor  History of gout  Patient gives history of several attacks of gout in the past  Check uric acid level   Encounter for hepatitis C screening test for low risk patient      History of Present Illness     Charly Reilly . is a 69 y.o. male who presents for evaluation of CKD.  He has diabetes since 1990s.  He has hypertension since 1990s.  He has history of coronary artery disease.  He does not have history of chronic NSAID use.  He does not have family history of kidney disease.  He currently feels well.  He denies dyspnea.  He denies leg swelling.  He denies any trouble with urination except nocturia.    >> Major risk factors for CKD  - Diabetes: Yes since 1990s  - Hypertension: Yes since 1990s   - Age >= 55 years: Yes   - Family history of kidney disease: No    - Obesity or metabolic syndrome: Yes     >> Medical history evaluation   - Prior kidney disease or dialysis: No   - Incidental hematuria in the past: No  - Urinary  symptoms: No   - History of foamy or frothy urine: No   - History of nephrolithiasis: No   - Diseases that share risk factors with CKD: DM, HTN, CAD  - Systemic diseases that might affect kidney: No   - History of use of medications that might affect renal function: No    History obtained from : patient  Review of Systems   Constitutional:  Negative for chills and fever.   HENT:  Negative for ear pain and sore throat.    Eyes:  Negative for pain and visual disturbance.   Respiratory:  Negative for cough and shortness of breath.    Cardiovascular:  Negative for chest pain and palpitations.   Gastrointestinal:  Negative for abdominal pain and vomiting.   Genitourinary:  Negative for dysuria and hematuria.   Musculoskeletal:  Negative for arthralgias and back pain.   Skin:  Negative for color change and rash.   Neurological:  Negative for seizures and syncope.   All other systems reviewed and are negative.    Past Medical History   Past Medical History:   Diagnosis Date    Diabetes mellitus (HCC)     Gout     High blood pressure     Hyperlipidemia     Hypertension     Sigmoid diverticulosis 12/03/2015     Past Surgical History:   Procedure Laterality Date    HYDROCELE EXCISION / REPAIR      NERVE BLOCK Bilateral 6/27/2023    Procedure: L4-S1 MEDIAL BRANCH BLOCK (88723,11240);  Surgeon: Naveed Olivas DO;  Location:  MAIN OR;  Service: Pain Management     IA EXCISION HYDROCELE UNILATERAL Right 3/31/2022    Procedure: HYDROCELECTOMY;  Surgeon: Steffen Alvarado MD;  Location: [facility];  Service: Urology     Family History   Problem Relation Age of Onset    Colon cancer Mother     Uterine cancer Mother     Heart attack Father     Breast cancer Sister      Current Outpatient Medications on File Prior to Visit   Medication Sig Dispense Refill    acetaminophen (TYLENOL) 650 mg CR tablet Take 1 tablet (650 mg total) by mouth every 8 (eight) hours as needed for mild pain 30 tablet 0    amLODIPine (NORVASC) 10 mg tablet Take  10 mg by mouth daily      betamethasone, augmented, (DIPROLENE-AF) 0.05 % cream Apply topically 2 (two) times a day      buPROPion (WELLBUTRIN XL) 150 mg 24 hr tablet Take 150 mg by mouth daily      carvedilol (COREG) 3.125 mg tablet Take 3.125 mg by mouth 2 (two) times a day with meals      latanoprost (XALATAN) 0.005 % ophthalmic solution       metFORMIN (GLUCOPHAGE) 1000 MG tablet Take 1,000 mg by mouth 2 (two) times a day with meals      pantoprazole (PROTONIX) 40 mg tablet Take 40 mg by mouth 2 (two) times a day      pioglitazone (ACTOS) 30 mg tablet Take 30 mg by mouth daily      simvastatin (ZOCOR) 10 mg tablet Take 10 mg by mouth 2 (two) times a day      tamsulosin (FLOMAX) 0.4 mg Take 0.4 mg by mouth daily with dinner      [DISCONTINUED] lisinopril (ZESTRIL) 20 mg tablet Take 30 mg by mouth 2 (two) times a day      methocarbamol (ROBAXIN) 500 mg tablet Take 1 tablet (500 mg total) by mouth daily at bedtime as needed for muscle spasms (Patient not taking: Reported on 6/9/2023) 10 tablet 0    methylPREDNISolone 4 MG tablet therapy pack Use as directed on package (Patient not taking: Reported on 6/9/2023) 21 tablet 0    sertraline (ZOLOFT) 50 mg tablet Take 50 mg by mouth daily (Patient not taking: Reported on 6/9/2023)      sitaGLIPtin-metFORMIN (JANUMET)  MG per tablet Take 1 tablet by mouth 2 (two) times a day with meals (Patient not taking: Reported on 11/7/2024)      [DISCONTINUED] indomethacin (INDOCIN) 50 mg capsule Take 50 mg by mouth 2 (two) times a day with meals (Patient not taking: Reported on 11/7/2024)      [DISCONTINUED] naproxen sodium (ALEVE) 220 MG tablet Take 1 tablet (220 mg total) by mouth 2 (two) times a day with meals (Patient not taking: Reported on 11/7/2024) 20 tablet 0     No current facility-administered medications on file prior to visit.   No Known Allergies       Objective     BP (!) 178/80 (BP Location: Left arm, Patient Position: Sitting, Cuff Size: Adult)   Pulse 70    "Ht 5' 10\" (1.778 m)   Wt 98 kg (216 lb)   BMI 30.99 kg/m²     Physical Exam  Vitals and nursing note reviewed.   Constitutional:       General: He is not in acute distress.     Appearance: He is well-developed.   HENT:      Head: Normocephalic and atraumatic.   Eyes:      Conjunctiva/sclera: Conjunctivae normal.   Cardiovascular:      Rate and Rhythm: Normal rate and regular rhythm.      Pulses: Normal pulses.      Heart sounds: Normal heart sounds. No murmur heard.  Pulmonary:      Effort: Pulmonary effort is normal. No respiratory distress.      Breath sounds: Normal breath sounds.   Abdominal:      Palpations: Abdomen is soft.      Tenderness: There is no abdominal tenderness.   Musculoskeletal:         General: No swelling.      Cervical back: Neck supple.      Right lower leg: No edema.      Left lower leg: No edema.   Skin:     General: Skin is warm and dry.      Capillary Refill: Capillary refill takes less than 2 seconds.   Neurological:      Mental Status: He is alert.   Psychiatric:         Mood and Affect: Mood normal.         "

## 2024-11-11 ENCOUNTER — DOCUMENTATION (OUTPATIENT)
Dept: NEPHROLOGY | Facility: CLINIC | Age: 69
End: 2024-11-11

## 2024-11-11 ENCOUNTER — APPOINTMENT (OUTPATIENT)
Dept: LAB | Facility: HOSPITAL | Age: 69
End: 2024-11-11
Attending: INTERNAL MEDICINE
Payer: COMMERCIAL

## 2024-11-11 DIAGNOSIS — Z11.59 ENCOUNTER FOR HEPATITIS C SCREENING TEST FOR LOW RISK PATIENT: ICD-10-CM

## 2024-11-11 DIAGNOSIS — N18.32 TYPE 2 DIABETES MELLITUS WITH STAGE 3B CHRONIC KIDNEY DISEASE, WITHOUT LONG-TERM CURRENT USE OF INSULIN (HCC): ICD-10-CM

## 2024-11-11 DIAGNOSIS — I12.9 BENIGN HYPERTENSION WITH CKD (CHRONIC KIDNEY DISEASE) STAGE III (HCC): ICD-10-CM

## 2024-11-11 DIAGNOSIS — N18.32 STAGE 3B CHRONIC KIDNEY DISEASE (HCC): ICD-10-CM

## 2024-11-11 DIAGNOSIS — N18.30 BENIGN HYPERTENSION WITH CKD (CHRONIC KIDNEY DISEASE) STAGE III (HCC): ICD-10-CM

## 2024-11-11 DIAGNOSIS — Z87.39 HISTORY OF GOUT: ICD-10-CM

## 2024-11-11 DIAGNOSIS — E87.5 HYPERKALEMIA: ICD-10-CM

## 2024-11-11 DIAGNOSIS — N25.81 SECONDARY HYPERPARATHYROIDISM (HCC): Primary | ICD-10-CM

## 2024-11-11 DIAGNOSIS — E11.22 TYPE 2 DIABETES MELLITUS WITH STAGE 3B CHRONIC KIDNEY DISEASE, WITHOUT LONG-TERM CURRENT USE OF INSULIN (HCC): ICD-10-CM

## 2024-11-11 LAB
25(OH)D3 SERPL-MCNC: 30.1 NG/ML (ref 30–100)
ALBUMIN SERPL BCG-MCNC: 4.5 G/DL (ref 3.5–5)
ANA SER QL IA: NEGATIVE
ANION GAP SERPL CALCULATED.3IONS-SCNC: 6 MMOL/L (ref 4–13)
BACTERIA UR QL AUTO: ABNORMAL /HPF
BILIRUB UR QL STRIP: NEGATIVE
BUN SERPL-MCNC: 27 MG/DL (ref 5–25)
C3 SERPL-MCNC: 146 MG/DL (ref 87–200)
C4 SERPL-MCNC: 31 MG/DL (ref 19–52)
CALCIUM SERPL-MCNC: 9.2 MG/DL (ref 8.4–10.2)
CHLORIDE SERPL-SCNC: 103 MMOL/L (ref 96–108)
CLARITY UR: CLEAR
CO2 SERPL-SCNC: 29 MMOL/L (ref 21–32)
COLOR UR: YELLOW
CREAT SERPL-MCNC: 2.31 MG/DL (ref 0.6–1.3)
CREAT UR-MCNC: 117.3 MG/DL
CREAT UR-MCNC: 118.9 MG/DL
GFR SERPL CREATININE-BSD FRML MDRD: 27 ML/MIN/1.73SQ M
GLUCOSE P FAST SERPL-MCNC: 179 MG/DL (ref 65–99)
GLUCOSE UR STRIP-MCNC: NEGATIVE MG/DL
HBV CORE AB SER QL: NORMAL
HBV CORE IGM SER QL: NORMAL
HBV SURFACE AG SER QL: NORMAL
HCV AB SER QL: NORMAL
HGB UR QL STRIP.AUTO: NEGATIVE
KETONES UR STRIP-MCNC: NEGATIVE MG/DL
LEUKOCYTE ESTERASE UR QL STRIP: NEGATIVE
MICROALBUMIN UR-MCNC: 329.7 MG/L
MICROALBUMIN/CREAT 24H UR: 281 MG/G CREATININE (ref 0–30)
NITRITE UR QL STRIP: NEGATIVE
NON-SQ EPI CELLS URNS QL MICRO: ABNORMAL /HPF
PH UR STRIP.AUTO: 6 [PH]
PHOSPHATE SERPL-MCNC: 3.7 MG/DL (ref 2.3–4.1)
POTASSIUM SERPL-SCNC: 4.7 MMOL/L (ref 3.5–5.3)
PROT UR STRIP-MCNC: ABNORMAL MG/DL
PROT UR-MCNC: 48.8 MG/DL
PROT/CREAT UR: 0.4 MG/G{CREAT} (ref 0–0.1)
PTH-INTACT SERPL-MCNC: 124.9 PG/ML (ref 12–88)
RBC #/AREA URNS AUTO: ABNORMAL /HPF
SODIUM SERPL-SCNC: 138 MMOL/L (ref 135–147)
SP GR UR STRIP.AUTO: 1.02 (ref 1–1.03)
URATE SERPL-MCNC: 8.5 MG/DL (ref 3.5–8.5)
UROBILINOGEN UR STRIP-ACNC: <2 MG/DL
WBC #/AREA URNS AUTO: ABNORMAL /HPF

## 2024-11-11 PROCEDURE — 82570 ASSAY OF URINE CREATININE: CPT

## 2024-11-11 PROCEDURE — 81001 URINALYSIS AUTO W/SCOPE: CPT

## 2024-11-11 PROCEDURE — 83520 IMMUNOASSAY QUANT NOS NONAB: CPT

## 2024-11-11 PROCEDURE — 84165 PROTEIN E-PHORESIS SERUM: CPT

## 2024-11-11 PROCEDURE — 84550 ASSAY OF BLOOD/URIC ACID: CPT

## 2024-11-11 PROCEDURE — 80069 RENAL FUNCTION PANEL: CPT

## 2024-11-11 PROCEDURE — 86704 HEP B CORE ANTIBODY TOTAL: CPT

## 2024-11-11 PROCEDURE — 87340 HEPATITIS B SURFACE AG IA: CPT

## 2024-11-11 PROCEDURE — 86705 HEP B CORE ANTIBODY IGM: CPT

## 2024-11-11 PROCEDURE — 36415 COLL VENOUS BLD VENIPUNCTURE: CPT

## 2024-11-11 PROCEDURE — 82043 UR ALBUMIN QUANTITATIVE: CPT

## 2024-11-11 PROCEDURE — 84166 PROTEIN E-PHORESIS/URINE/CSF: CPT

## 2024-11-11 PROCEDURE — 84156 ASSAY OF PROTEIN URINE: CPT

## 2024-11-11 PROCEDURE — 86037 ANCA TITER EACH ANTIBODY: CPT

## 2024-11-11 PROCEDURE — 86225 DNA ANTIBODY NATIVE: CPT

## 2024-11-11 PROCEDURE — 83970 ASSAY OF PARATHORMONE: CPT

## 2024-11-11 PROCEDURE — 86803 HEPATITIS C AB TEST: CPT

## 2024-11-11 PROCEDURE — 86160 COMPLEMENT ANTIGEN: CPT

## 2024-11-11 PROCEDURE — 86038 ANTINUCLEAR ANTIBODIES: CPT

## 2024-11-11 PROCEDURE — 82306 VITAMIN D 25 HYDROXY: CPT

## 2024-11-11 PROCEDURE — 83521 IG LIGHT CHAINS FREE EACH: CPT

## 2024-11-11 NOTE — PROGRESS NOTES
Follow-up CKD  Creatinine 2.31 at new baseline  Potassium 4.7 improved after initiation of chlorthalidone and decreasing the dose of lisinopril  UA: 1+ protein, no RBC  UPCR 400 mg, UACR 281 mg  Uric acid 8.5  No hypocomplementemia  YASMANI negative/dsDNA negative  Anti-GBM negative  Hep B/hep C negative  .9, 25-hydroxy vitamin D level 30.1  SPEP no monoclonal band  Free light chain ratio 1.9 in setting of CKD  UPEP/ANCA pending    Plan  Discussed with PCP (Olga Bhatia office number: [phone]) and advised to stop metformin  Continue chlorthalidone  Continue decreased dose of lisinopril  Start allopurinol 50 mg daily  Start cholecalciferol 1000 units daily  Will update this document as results come in

## 2024-11-12 ENCOUNTER — TELEPHONE (OUTPATIENT)
Age: 69
End: 2024-11-12

## 2024-11-12 ENCOUNTER — HOSPITAL ENCOUNTER (OUTPATIENT)
Dept: RADIOLOGY | Facility: HOSPITAL | Age: 69
Discharge: HOME | End: 2024-11-12
Attending: INTERNAL MEDICINE
Payer: COMMERCIAL

## 2024-11-12 DIAGNOSIS — E11.22 TYPE 2 DIABETES MELLITUS WITH STAGE 3B CHRONIC KIDNEY DISEASE, WITHOUT LONG-TERM CURRENT USE OF INSULIN (HCC): ICD-10-CM

## 2024-11-12 DIAGNOSIS — I12.9 BENIGN HYPERTENSION WITH CKD (CHRONIC KIDNEY DISEASE) STAGE III (HCC): ICD-10-CM

## 2024-11-12 DIAGNOSIS — N18.32 TYPE 2 DIABETES MELLITUS WITH STAGE 3B CHRONIC KIDNEY DISEASE, WITHOUT LONG-TERM CURRENT USE OF INSULIN (HCC): ICD-10-CM

## 2024-11-12 DIAGNOSIS — N18.32 STAGE 3B CHRONIC KIDNEY DISEASE (HCC): ICD-10-CM

## 2024-11-12 DIAGNOSIS — N18.30 BENIGN HYPERTENSION WITH CKD (CHRONIC KIDNEY DISEASE) STAGE III (HCC): ICD-10-CM

## 2024-11-12 LAB
DSDNA AB SER-ACNC: 1 IU/ML (ref 0–9)
GBM AB SER IA-ACNC: <0.2 UNITS (ref 0–0.9)
KAPPA LC FREE SER-MCNC: 45.3 MG/L (ref 3.3–19.4)
KAPPA LC FREE/LAMBDA FREE SER: 1.91 {RATIO} (ref 0.26–1.65)
LAMBDA LC FREE SERPL-MCNC: 23.7 MG/L (ref 5.7–26.3)

## 2024-11-12 PROCEDURE — 76770 US EXAM ABDO BACK WALL COMP: CPT

## 2024-11-12 NOTE — TELEPHONE ENCOUNTER
[facility] calling to see what lab patient needs repeated every few weeks and for dx code. Made aware per last OV note:    We will do renal function panel blood test every 4 weeks  Dx codes provided  Also, asking for procedure code. Call placed to office and spoke with Shahida. States we do not use procedure codes. Made aware to contact lab. Number given for [facility] in [place].

## 2024-11-13 LAB
ALBUMIN SERPL ELPH-MCNC: 4.34 G/DL (ref 3.2–5.1)
ALBUMIN SERPL ELPH-MCNC: 63.8 % (ref 48–70)
ALPHA1 GLOB SERPL ELPH-MCNC: 0.24 G/DL (ref 0.15–0.47)
ALPHA1 GLOB SERPL ELPH-MCNC: 3.6 % (ref 1.8–7)
ALPHA2 GLOB SERPL ELPH-MCNC: 0.72 G/DL (ref 0.42–1.04)
ALPHA2 GLOB SERPL ELPH-MCNC: 10.6 % (ref 5.9–14.9)
BETA GLOB ABNORMAL SERPL ELPH-MCNC: 0.41 G/DL (ref 0.31–0.57)
BETA1 GLOB SERPL ELPH-MCNC: 6 % (ref 4.7–7.7)
BETA2 GLOB SERPL ELPH-MCNC: 5.5 % (ref 3.1–7.9)
BETA2+GAMMA GLOB SERPL ELPH-MCNC: 0.37 G/DL (ref 0.2–0.58)
C-ANCA TITR SER IF: NORMAL TITER
GAMMA GLOB ABNORMAL SERPL ELPH-MCNC: 0.71 G/DL (ref 0.4–1.66)
GAMMA GLOB SERPL ELPH-MCNC: 10.5 % (ref 6.9–22.3)
IGG/ALB SER: 1.76 {RATIO} (ref 1.1–1.8)
MYELOPEROXIDASE AB SER IA-ACNC: <0.2 UNITS (ref 0–0.9)
P-ANCA ATYPICAL TITR SER IF: NORMAL TITER
P-ANCA TITR SER IF: NORMAL TITER
PROT PATTERN SERPL ELPH-IMP: NORMAL
PROT SERPL-MCNC: 6.8 G/DL (ref 6.4–8.2)
PROTEINASE3 AB SER IA-ACNC: <0.2 UNITS (ref 0–0.9)

## 2024-11-13 PROCEDURE — 84165 PROTEIN E-PHORESIS SERUM: CPT | Performed by: STUDENT IN AN ORGANIZED HEALTH CARE EDUCATION/TRAINING PROGRAM

## 2024-11-13 RX ORDER — ALLOPURINOL 100 MG/1
50 TABLET ORAL DAILY
Qty: 45 TABLET | Refills: 3 | Status: SHIPPED | OUTPATIENT
Start: 2024-11-13 | End: 2025-02-11

## 2024-11-14 LAB
ALBUMIN UR ELPH-MCNC: 100 %
ALPHA1 GLOB MFR UR ELPH: 0 %
ALPHA2 GLOB MFR UR ELPH: 0 %
B-GLOBULIN MFR UR ELPH: 0 %
GAMMA GLOB MFR UR ELPH: 0 %
PROT PATTERN UR ELPH-IMP: NORMAL
PROT UR-MCNC: 47.2 MG/DL

## 2024-11-14 PROCEDURE — 84166 PROTEIN E-PHORESIS/URINE/CSF: CPT | Performed by: STUDENT IN AN ORGANIZED HEALTH CARE EDUCATION/TRAINING PROGRAM

## 2024-11-18 ENCOUNTER — RESULTS FOLLOW-UP (OUTPATIENT)
Dept: OTHER | Facility: HOSPITAL | Age: 69
End: 2024-11-18

## 2024-11-18 NOTE — TELEPHONE ENCOUNTER
----- Message from Marcus Messer MD sent at 11/18/2024 12:36 PM EST -----  Please let the patient know that kidney ultrasound shows that kidneys are normal in structure with no obstruction to flow of urine.  Prostate is enlarged, continue to follow-up with PCP regarding this.

## 2024-12-11 ENCOUNTER — APPOINTMENT (OUTPATIENT)
Dept: LAB | Facility: HOSPITAL | Age: 69
End: 2024-12-11
Attending: INTERNAL MEDICINE
Payer: COMMERCIAL

## 2024-12-11 DIAGNOSIS — I12.9 BENIGN HYPERTENSION WITH CKD (CHRONIC KIDNEY DISEASE) STAGE III (HCC): ICD-10-CM

## 2024-12-11 DIAGNOSIS — E87.5 HYPERKALEMIA: ICD-10-CM

## 2024-12-11 DIAGNOSIS — N18.32 STAGE 3B CHRONIC KIDNEY DISEASE (HCC): ICD-10-CM

## 2024-12-11 DIAGNOSIS — N18.30 BENIGN HYPERTENSION WITH CKD (CHRONIC KIDNEY DISEASE) STAGE III (HCC): ICD-10-CM

## 2024-12-11 LAB
ALBUMIN SERPL BCG-MCNC: 4.3 G/DL (ref 3.5–5)
ANION GAP SERPL CALCULATED.3IONS-SCNC: 7 MMOL/L (ref 4–13)
BUN SERPL-MCNC: 28 MG/DL (ref 5–25)
CALCIUM SERPL-MCNC: 9.2 MG/DL (ref 8.4–10.2)
CHLORIDE SERPL-SCNC: 102 MMOL/L (ref 96–108)
CO2 SERPL-SCNC: 27 MMOL/L (ref 21–32)
CREAT SERPL-MCNC: 2.51 MG/DL (ref 0.6–1.3)
GFR SERPL CREATININE-BSD FRML MDRD: 24 ML/MIN/1.73SQ M
GLUCOSE P FAST SERPL-MCNC: 223 MG/DL (ref 65–99)
PHOSPHATE SERPL-MCNC: 2.9 MG/DL (ref 2.3–4.1)
POTASSIUM SERPL-SCNC: 5.1 MMOL/L (ref 3.5–5.3)
SODIUM SERPL-SCNC: 136 MMOL/L (ref 135–147)

## 2024-12-11 PROCEDURE — 80069 RENAL FUNCTION PANEL: CPT

## 2024-12-11 PROCEDURE — 36415 COLL VENOUS BLD VENIPUNCTURE: CPT

## 2024-12-19 ENCOUNTER — TELEPHONE (OUTPATIENT)
Age: 69
End: 2024-12-19

## 2024-12-19 DIAGNOSIS — I10 HYPERTENSION, UNSPECIFIED TYPE: Primary | ICD-10-CM

## 2024-12-19 NOTE — TELEPHONE ENCOUNTER
Patient calling in with his B/P readings from the last week. Confirmed he has been taking his medications per med list-HOWEVER-he is only taking 20 mg of lisinopril due to he said his wife tried to split in half and the pill would crumble (per med list supposed to take 30 mg daily.) Please advise if any changes and call patient.     Weekly Blood Pressure Results   Date  12/12 12/12 12/13 12/13 12/14 12/14 12/15   Time AM  PM  AM   PM  AM  PM  AM   Blood Pressure 139/71  147/64 146/76 148/66 146/66 143/66 148/67   Pulse 79  62 60 56 59 67 55   Position of patient  Sitting  Sitting Sitting   Sitting Sitting  Sitting  Sitting    Additional Information                   Weekly Blood Pressure Results   Date 12/15  12/16 12/16 12/17 12/17 12/18 12/18   Time  PM AM   PM  AM  PM  AM  PM   Blood Pressure 164/70  136/61 152/64 138/63 149/67 139/66 152/69   Pulse 64  63 60 60 59 58 63   Position of patient Sitting  Sitting   Sitting Sitting   Sitting  Sitting  Sitting   Additional Information                       12/19 am was 145/66 61 pulse

## 2024-12-20 NOTE — TELEPHONE ENCOUNTER
Called patient but no answer and unable to leave a message:    Blood pressure is above goal.  He is currently taking half tablet of chlorthalidone.  Please advise him to take full tablet of chlorthalidone.  Please send a prescription for chlorthalidone 25 mg once daily and repeat BMP in 1 month.  Thank you

## 2025-01-10 ENCOUNTER — APPOINTMENT (OUTPATIENT)
Dept: LAB | Facility: HOSPITAL | Age: 70
End: 2025-01-10
Attending: INTERNAL MEDICINE
Payer: COMMERCIAL

## 2025-01-10 DIAGNOSIS — I10 HYPERTENSION, UNSPECIFIED TYPE: ICD-10-CM

## 2025-01-10 DIAGNOSIS — N18.30 BENIGN HYPERTENSION WITH CKD (CHRONIC KIDNEY DISEASE) STAGE III (HCC): ICD-10-CM

## 2025-01-10 DIAGNOSIS — E87.5 HYPERKALEMIA: ICD-10-CM

## 2025-01-10 DIAGNOSIS — N18.32 STAGE 3B CHRONIC KIDNEY DISEASE (HCC): ICD-10-CM

## 2025-01-10 DIAGNOSIS — I12.9 BENIGN HYPERTENSION WITH CKD (CHRONIC KIDNEY DISEASE) STAGE III (HCC): ICD-10-CM

## 2025-01-10 LAB
ALBUMIN SERPL BCG-MCNC: 4.4 G/DL (ref 3.5–5)
ANION GAP SERPL CALCULATED.3IONS-SCNC: 6 MMOL/L (ref 4–13)
BUN SERPL-MCNC: 31 MG/DL (ref 5–25)
CALCIUM SERPL-MCNC: 9 MG/DL (ref 8.4–10.2)
CHLORIDE SERPL-SCNC: 104 MMOL/L (ref 96–108)
CO2 SERPL-SCNC: 29 MMOL/L (ref 21–32)
CREAT SERPL-MCNC: 2.53 MG/DL (ref 0.6–1.3)
GFR SERPL CREATININE-BSD FRML MDRD: 24 ML/MIN/1.73SQ M
GLUCOSE P FAST SERPL-MCNC: 231 MG/DL (ref 65–99)
PHOSPHATE SERPL-MCNC: 3.7 MG/DL (ref 2.3–4.1)
POTASSIUM SERPL-SCNC: 5.1 MMOL/L (ref 3.5–5.3)
SODIUM SERPL-SCNC: 139 MMOL/L (ref 135–147)

## 2025-01-10 PROCEDURE — 80069 RENAL FUNCTION PANEL: CPT

## 2025-01-10 PROCEDURE — 36415 COLL VENOUS BLD VENIPUNCTURE: CPT

## 2025-02-10 ENCOUNTER — APPOINTMENT (OUTPATIENT)
Dept: LAB | Facility: HOSPITAL | Age: 70
End: 2025-02-10
Attending: INTERNAL MEDICINE
Payer: COMMERCIAL

## 2025-02-10 DIAGNOSIS — N18.30 BENIGN HYPERTENSION WITH CKD (CHRONIC KIDNEY DISEASE) STAGE III (HCC): ICD-10-CM

## 2025-02-10 DIAGNOSIS — N18.32 STAGE 3B CHRONIC KIDNEY DISEASE (HCC): ICD-10-CM

## 2025-02-10 DIAGNOSIS — E87.5 HYPERKALEMIA: ICD-10-CM

## 2025-02-10 DIAGNOSIS — I12.9 BENIGN HYPERTENSION WITH CKD (CHRONIC KIDNEY DISEASE) STAGE III (HCC): ICD-10-CM

## 2025-02-10 LAB
ALBUMIN SERPL BCG-MCNC: 4.5 G/DL (ref 3.5–5)
ANION GAP SERPL CALCULATED.3IONS-SCNC: 12 MMOL/L (ref 4–13)
BUN SERPL-MCNC: 37 MG/DL (ref 5–25)
CALCIUM SERPL-MCNC: 9.3 MG/DL (ref 8.4–10.2)
CHLORIDE SERPL-SCNC: 105 MMOL/L (ref 96–108)
CO2 SERPL-SCNC: 22 MMOL/L (ref 21–32)
CREAT SERPL-MCNC: 2.87 MG/DL (ref 0.6–1.3)
GFR SERPL CREATININE-BSD FRML MDRD: 21 ML/MIN/1.73SQ M
GLUCOSE P FAST SERPL-MCNC: 227 MG/DL (ref 65–99)
PHOSPHATE SERPL-MCNC: 3.6 MG/DL (ref 2.3–4.1)
POTASSIUM SERPL-SCNC: 5.2 MMOL/L (ref 3.5–5.3)
SODIUM SERPL-SCNC: 139 MMOL/L (ref 135–147)

## 2025-02-10 PROCEDURE — 36415 COLL VENOUS BLD VENIPUNCTURE: CPT

## 2025-02-10 PROCEDURE — 80069 RENAL FUNCTION PANEL: CPT

## 2025-02-27 ENCOUNTER — OFFICE VISIT (OUTPATIENT)
Dept: NEPHROLOGY | Facility: CLINIC | Age: 70
End: 2025-02-27
Payer: COMMERCIAL

## 2025-02-27 VITALS
OXYGEN SATURATION: 96 % | BODY MASS INDEX: 31.21 KG/M2 | DIASTOLIC BLOOD PRESSURE: 68 MMHG | HEIGHT: 70 IN | WEIGHT: 218 LBS | SYSTOLIC BLOOD PRESSURE: 161 MMHG | HEART RATE: 56 BPM

## 2025-02-27 DIAGNOSIS — E11.22 TYPE 2 DIABETES MELLITUS WITH STAGE 3B CHRONIC KIDNEY DISEASE, WITHOUT LONG-TERM CURRENT USE OF INSULIN (HCC): ICD-10-CM

## 2025-02-27 DIAGNOSIS — I12.9 BENIGN HYPERTENSION WITH CKD (CHRONIC KIDNEY DISEASE) STAGE III (HCC): ICD-10-CM

## 2025-02-27 DIAGNOSIS — N18.4 STAGE 4 CHRONIC KIDNEY DISEASE (HCC): Primary | ICD-10-CM

## 2025-02-27 DIAGNOSIS — N18.30 BENIGN HYPERTENSION WITH CKD (CHRONIC KIDNEY DISEASE) STAGE III (HCC): ICD-10-CM

## 2025-02-27 DIAGNOSIS — N18.32 TYPE 2 DIABETES MELLITUS WITH STAGE 3B CHRONIC KIDNEY DISEASE, WITHOUT LONG-TERM CURRENT USE OF INSULIN (HCC): ICD-10-CM

## 2025-02-27 DIAGNOSIS — Z87.39 HISTORY OF GOUT: ICD-10-CM

## 2025-02-27 DIAGNOSIS — N25.81 SECONDARY HYPERPARATHYROIDISM (HCC): ICD-10-CM

## 2025-02-27 DIAGNOSIS — Z86.39 HISTORY OF HYPERKALEMIA: ICD-10-CM

## 2025-02-27 PROCEDURE — 99214 OFFICE O/P EST MOD 30 MIN: CPT | Performed by: INTERNAL MEDICINE

## 2025-02-27 RX ORDER — LINAGLIPTIN 5 MG/1
5 TABLET, FILM COATED ORAL DAILY
COMMUNITY

## 2025-02-27 NOTE — PROGRESS NOTES
Name: Charly Reilly Jr.      : 1955      MRN: [id number]  Encounter Provider: Marcus Messer MD  Encounter Date: 2025   Encounter department: [facility]  :  Assessment & Plan  Stage 4 chronic kidney disease (HCC)  - Etiology/risk factors: Longstanding history of hypertension, longstanding history of diabetes, age-related nephron loss  - Baseline Cr: 1.6 in , 2.1-2.5 in , most recently 2.87 2025  - Urinalysis: 1+ protein, no RBC, no WBC  - Proteinuria: UACR 281 mg/g, UPCR 400 mg 2024  No hypocomplementemia  YASMANI negative/dsDNA negative  ANCA/Anti-GBM negative  Hep B/hep C negative  SPEP/UPEP no monoclonal band, KL ratio mildly elevated at 1.9  - Imaging: Right kidney 10.4 cm/left kidney 10.8 cm/normal echogenicity 2024  - Current Rx: RAAS blockade with lisinopril  - Changes: Add Jardiance 10 mg daily  - Check renal function panel in 4 weeks and then every 2 months  - KFRE 2-year 14%, 5-year 38% (high risk), CKD modality education now  - Discussed risk factor reduction to slow progression of chronic kidney disease  Intensive blood pressure control as below  Glycemic control as below  Lipid control, on simvastatin per PCP     # Screening for Anemia   - Target Hb: More than 10 g/dL  - Most recent hemoglobin: 11.9 g/dL 2022  Benign hypertension with CKD (chronic kidney disease) stage III (HCC)  - Goal BP <120/80 per KDIGO guidelines   - Volume status: Euvolemic  - Status: Blood pressure above goal  - Current antihypertensive regimen: Lisinopril 30 mg daily, amlodipine 10 mg daily, Flomax 0.4 mg daily, carvedilol 3.125 mg twice daily, chlorthalidone 12.5 mg daily  - Changes: Addition of Jardiance as above will help with further blood pressure control  Type 2 diabetes mellitus with stage 3b chronic kidney disease, without long-term current use of insulin (HCC)  Most recent hemoglobin A1c 7.4 2024 increased from 7.2 in 2022  Current Rx: Actos,  Tradjenta  Changes: Addition of Jardiance as above will also help with diabetes control  History of gout  Patient gives history of several attacks of gout in the past  Uric acid 8.5 11/2024  Current Rx: Lipid and ALT 50 mg daily  Changes: None  Check serum uric acid level in 6 months  Secondary hyperparathyroidism (HCC)  - Goal Ca 8.5-10 mg/dL, goal Phos 2.7-4.6 mg/dL, goal iPTH 30-70 pg/mL  - .9/calcium 9.3/phosphorus 3.6  - 25-hydroxy vitamin D level 30.1  - Current Rx: Cholecalciferol 1000 units daily  - Changes: None  - Reserve activated vitamin D for PTH level more than 150-200  History of hyperkalemia  Chronic hyperkalemia dates back to 2022  Etiology most likely type IV RTA in setting of diabetes +/- CKD +/- RAAS blockade  Most recent potassium level 5.2 02/2025  Potassium level has normalized after decreasing the dose of lisinopril and addition of chlorthalidone  Continue low potassium diet      History of Present Illness   HPI  Charly Reilly Jr. is a 69 y.o. male    Since last visit: He has been doing well.  He denies dyspnea.  He denies leg swelling.  He denies any trouble with urination.    From initial consultation: Charly Reilly Jr. is a 69 y.o. male who presents for evaluation of CKD.  He has diabetes since 1990s.  He has hypertension since 1990s.  He has history of coronary artery disease.  He does not have history of chronic NSAID use.  He does not have family history of kidney disease.       >> Major risk factors for CKD  - Diabetes: Yes since 1990s  - Hypertension: Yes since 1990s   - Age >= 55 years: Yes   - Family history of kidney disease: No    - Obesity or metabolic syndrome: Yes      >> Medical history evaluation   - Prior kidney disease or dialysis: No   - Incidental hematuria in the past: No  - Urinary symptoms: No   - History of foamy or frothy urine: No   - History of nephrolithiasis: No   - Diseases that share risk factors with CKD: DM, HTN, CAD  - Systemic diseases that might  affect kidney: No   - History of use of medications that might affect renal function: No    History obtained from: patient    Review of Systems   Constitutional:  Negative for chills and fever.   HENT:  Negative for ear pain and sore throat.    Eyes:  Negative for pain and visual disturbance.   Respiratory:  Negative for cough and shortness of breath.    Cardiovascular:  Negative for chest pain and palpitations.   Gastrointestinal:  Negative for abdominal pain and vomiting.   Genitourinary:  Negative for dysuria and hematuria.   Musculoskeletal:  Negative for arthralgias and back pain.   Skin:  Negative for color change and rash.   Neurological:  Negative for seizures and syncope.   All other systems reviewed and are negative.    Past Medical History   Past Medical History:   Diagnosis Date    Diabetes mellitus (HCC)     Gout     High blood pressure     Hyperlipidemia     Hypertension     Sigmoid diverticulosis 12/03/2015     Past Surgical History:   Procedure Laterality Date    HYDROCELE EXCISION / REPAIR      NERVE BLOCK Bilateral 6/27/2023    Procedure: L4-S1 MEDIAL BRANCH BLOCK (18611,[id number]);  Surgeon: Naveed Olivas DO;  Location:  MAIN OR;  Service: Pain Management     ID EXCISION HYDROCELE UNILATERAL Right 3/31/2022    Procedure: HYDROCELECTOMY;  Surgeon: Steffen Alvarado MD;  Location: [facility];  Service: Urology     Family History   Problem Relation Age of Onset    Colon cancer Mother     Uterine cancer Mother     Heart attack Father     Breast cancer Sister       reports that he has quit smoking. His smoking use included cigarettes. He has a 18 pack-year smoking history. He has never used smokeless tobacco. He reports that he does not drink alcohol and does not use drugs.  Current Outpatient Medications   Medication Instructions    acetaminophen (TYLENOL) 650 mg, Oral, Every 8 hours PRN    allopurinol (ZYLOPRIM) 50 mg, Oral, Daily    amLODIPine (NORVASC) 10 mg, Daily    betamethasone, augmented,  "(DIPROLENE-AF) 0.05 % cream 2 times daily    buPROPion (WELLBUTRIN XL) 150 mg, Daily    carvedilol (COREG) 3.125 mg, 2 times daily with meals    chlorthalidone 12.5 mg, Oral, Daily    Cholecalciferol (VITAMIN D3) 1,000 Units, Oral, Daily    Empagliflozin (JARDIANCE) 10 mg, Oral, Every morning    latanoprost (XALATAN) 0.005 % ophthalmic solution No dose, route, or frequency recorded.    lisinopril (ZESTRIL) 30 mg, Oral, Daily    methocarbamol (ROBAXIN) 500 mg, Oral, Daily at bedtime PRN    methylPREDNISolone 4 MG tablet therapy pack Use as directed on package    pantoprazole (PROTONIX) 40 mg, 2 times daily    pioglitazone (ACTOS) 30 mg, Daily    sertraline (ZOLOFT) 50 mg, Daily    simvastatin (ZOCOR) 10 mg, 2 times daily    tamsulosin (FLOMAX) 0.4 mg, Daily with dinner    Tradjenta 5 mg, Daily   No Known Allergies      Objective   /68 (BP Location: Left arm, Patient Position: Sitting, Cuff Size: Adult)   Pulse 56   Ht 5' 10\" (1.778 m)   Wt 98.9 kg (218 lb)   SpO2 96%   BMI 31.28 kg/m²      Physical Exam  Vitals and nursing note reviewed.   Constitutional:       General: He is not in acute distress.     Appearance: He is well-developed.   HENT:      Head: Normocephalic and atraumatic.   Eyes:      Conjunctiva/sclera: Conjunctivae normal.   Cardiovascular:      Rate and Rhythm: Normal rate and regular rhythm.      Pulses: Normal pulses.      Heart sounds: Normal heart sounds. No murmur heard.  Pulmonary:      Effort: Pulmonary effort is normal. No respiratory distress.      Breath sounds: Normal breath sounds.   Abdominal:      Tenderness: There is no right CVA tenderness or left CVA tenderness.   Musculoskeletal:         General: No swelling.      Cervical back: Neck supple.      Right lower leg: No edema.      Left lower leg: No edema.   Skin:     General: Skin is warm and dry.      Capillary Refill: Capillary refill takes less than 2 seconds.   Neurological:      Mental Status: He is alert.   Psychiatric: "         Mood and Affect: Mood normal.

## 2025-02-27 NOTE — ASSESSMENT & PLAN NOTE
Most recent hemoglobin A1c 7.4 02/2024 increased from 7.2 in 09/2022  Current Rx: Actos, Tradjenta  Changes: Addition of Jardiance as above will also help with diabetes control

## 2025-02-27 NOTE — PATIENT INSTRUCTIONS
Your kidney function is currently at stage 4 kidney disease.    Blood pressure remains above goal.    Start Jardiance 10 mg daily.    After starting Jardiance, check renal function panel in 1 month.    After that, check renal function panel every 8 weeks.    Check full panel of lab testing in 6 months.    Follow-up in office in 6 months.

## 2025-03-28 ENCOUNTER — APPOINTMENT (OUTPATIENT)
Dept: LAB | Facility: HOSPITAL | Age: 70
End: 2025-03-28
Attending: INTERNAL MEDICINE
Payer: COMMERCIAL

## 2025-03-28 ENCOUNTER — TELEPHONE (OUTPATIENT)
Age: 70
End: 2025-03-28

## 2025-03-28 DIAGNOSIS — N18.32 TYPE 2 DIABETES MELLITUS WITH STAGE 3B CHRONIC KIDNEY DISEASE, WITHOUT LONG-TERM CURRENT USE OF INSULIN (HCC): ICD-10-CM

## 2025-03-28 DIAGNOSIS — N18.4 STAGE 4 CHRONIC KIDNEY DISEASE (HCC): ICD-10-CM

## 2025-03-28 DIAGNOSIS — N18.32 STAGE 3B CHRONIC KIDNEY DISEASE (HCC): ICD-10-CM

## 2025-03-28 DIAGNOSIS — E87.5 HYPERKALEMIA: ICD-10-CM

## 2025-03-28 DIAGNOSIS — E11.22 TYPE 2 DIABETES MELLITUS WITH STAGE 3B CHRONIC KIDNEY DISEASE, WITHOUT LONG-TERM CURRENT USE OF INSULIN (HCC): ICD-10-CM

## 2025-03-28 DIAGNOSIS — N18.30 BENIGN HYPERTENSION WITH CKD (CHRONIC KIDNEY DISEASE) STAGE III (HCC): ICD-10-CM

## 2025-03-28 DIAGNOSIS — I12.9 BENIGN HYPERTENSION WITH CKD (CHRONIC KIDNEY DISEASE) STAGE III (HCC): ICD-10-CM

## 2025-03-28 LAB
ALBUMIN SERPL BCG-MCNC: 4.5 G/DL (ref 3.5–5)
ANION GAP SERPL CALCULATED.3IONS-SCNC: 8 MMOL/L (ref 4–13)
BUN SERPL-MCNC: 50 MG/DL (ref 5–25)
CALCIUM SERPL-MCNC: 9.6 MG/DL (ref 8.4–10.2)
CHLORIDE SERPL-SCNC: 109 MMOL/L (ref 96–108)
CO2 SERPL-SCNC: 21 MMOL/L (ref 21–32)
CREAT SERPL-MCNC: 3.27 MG/DL (ref 0.6–1.3)
GFR SERPL CREATININE-BSD FRML MDRD: 17 ML/MIN/1.73SQ M
GLUCOSE P FAST SERPL-MCNC: 143 MG/DL (ref 65–99)
PHOSPHATE SERPL-MCNC: 5 MG/DL (ref 2.3–4.1)
POTASSIUM SERPL-SCNC: 5.9 MMOL/L (ref 3.5–5.3)
SODIUM SERPL-SCNC: 138 MMOL/L (ref 135–147)

## 2025-03-28 PROCEDURE — 36415 COLL VENOUS BLD VENIPUNCTURE: CPT

## 2025-03-28 PROCEDURE — 80069 RENAL FUNCTION PANEL: CPT

## 2025-03-28 NOTE — TELEPHONE ENCOUNTER
"Patient calling in regards to lab order he is to complete in May.    States \"I am not sure if I am do to 8 weeks after that date then too. I did it this morning and then need to in 8 weeks but is that all he wants then and said I was to see someone about discussing my GFR?\"    Patient also states \"He gave me some Jardiance samples but I took the last one yesterday and didn't know if I could get more?\"    Unable to reach cts team member at this time.    Please call patient for clarity in above mentioned.   "

## 2025-03-31 ENCOUNTER — APPOINTMENT (OUTPATIENT)
Dept: LAB | Facility: HOSPITAL | Age: 70
End: 2025-03-31
Attending: INTERNAL MEDICINE
Payer: COMMERCIAL

## 2025-03-31 ENCOUNTER — RESULTS FOLLOW-UP (OUTPATIENT)
Dept: OTHER | Facility: HOSPITAL | Age: 70
End: 2025-03-31

## 2025-03-31 ENCOUNTER — DOCUMENTATION (OUTPATIENT)
Dept: NEPHROLOGY | Facility: CLINIC | Age: 70
End: 2025-03-31

## 2025-03-31 DIAGNOSIS — E87.5 HYPERKALEMIA: Primary | ICD-10-CM

## 2025-03-31 DIAGNOSIS — E87.5 HYPERKALEMIA: ICD-10-CM

## 2025-03-31 LAB
ANION GAP SERPL CALCULATED.3IONS-SCNC: 9 MMOL/L (ref 4–13)
BUN SERPL-MCNC: 57 MG/DL (ref 5–25)
CALCIUM SERPL-MCNC: 9.8 MG/DL (ref 8.4–10.2)
CHLORIDE SERPL-SCNC: 108 MMOL/L (ref 96–108)
CO2 SERPL-SCNC: 19 MMOL/L (ref 21–32)
CREAT SERPL-MCNC: 3.08 MG/DL (ref 0.6–1.3)
GFR SERPL CREATININE-BSD FRML MDRD: 19 ML/MIN/1.73SQ M
GLUCOSE P FAST SERPL-MCNC: 171 MG/DL (ref 65–99)
POTASSIUM SERPL-SCNC: 6 MMOL/L (ref 3.5–5.3)
SODIUM SERPL-SCNC: 136 MMOL/L (ref 135–147)

## 2025-03-31 PROCEDURE — 36415 COLL VENOUS BLD VENIPUNCTURE: CPT

## 2025-03-31 PROCEDURE — 80048 BASIC METABOLIC PNL TOTAL CA: CPT

## 2025-03-31 RX ORDER — SODIUM BICARBONATE 650 MG/1
650 TABLET ORAL 2 TIMES DAILY
Qty: 60 TABLET | Refills: 3 | Status: SHIPPED | OUTPATIENT
Start: 2025-03-31

## 2025-03-31 RX ORDER — PATIROMER 8.4 G/1
8.4 POWDER, FOR SUSPENSION ORAL DAILY
COMMUNITY
End: 2025-04-04

## 2025-03-31 NOTE — TELEPHONE ENCOUNTER
Phone call from patient with questions regarding call received. Attempted to transfer to [facility] (not avail). Please contact patient's wife to discuss further.

## 2025-03-31 NOTE — TELEPHONE ENCOUNTER
----- Message from Adela Gonzales PA-C sent at 3/31/2025 11:17 AM EDT -----  Also ask him to start oral sodium bicarb 650mg bid . I have sent this to pharmacy. This can help his potassium and low bicarb       Adela Gonzales PA-C       3/31/25 11:16 AM  Result Note  Please let patient know potassium up to 6. Make sure he is NOT taking lisinopril and increase veltassa to 1 packet daily. Follow a low potassium diet. Repeat BMP Wednesday. Any chest pain or not feeling well go to the ER.

## 2025-03-31 NOTE — TELEPHONE ENCOUNTER
Spoke to the patient went to over results and recommendation. Patient agree to increase veltassa and will get lab works on Wednesday . Patients also stated that he is not taking Jardiance due to the cost of the medications.      Please let patient know potassium up to 6. Make sure he is NOT taking lisinopril and increase veltassa to 1 packet daily. Follow a low potassium diet. Repeat BMP Wednesday. Any chest pain or not feeling well go to the ER.

## 2025-04-02 ENCOUNTER — APPOINTMENT (OUTPATIENT)
Dept: LAB | Facility: HOSPITAL | Age: 70
End: 2025-04-02
Attending: INTERNAL MEDICINE
Payer: COMMERCIAL

## 2025-04-02 DIAGNOSIS — N18.32 TYPE 2 DIABETES MELLITUS WITH STAGE 3B CHRONIC KIDNEY DISEASE, WITHOUT LONG-TERM CURRENT USE OF INSULIN (HCC): ICD-10-CM

## 2025-04-02 DIAGNOSIS — N18.4 STAGE 4 CHRONIC KIDNEY DISEASE (HCC): ICD-10-CM

## 2025-04-02 DIAGNOSIS — Z87.39 HISTORY OF GOUT: ICD-10-CM

## 2025-04-02 DIAGNOSIS — E11.22 TYPE 2 DIABETES MELLITUS WITH STAGE 3B CHRONIC KIDNEY DISEASE, WITHOUT LONG-TERM CURRENT USE OF INSULIN (HCC): ICD-10-CM

## 2025-04-02 DIAGNOSIS — E87.5 HYPERKALEMIA: ICD-10-CM

## 2025-04-02 LAB
ALBUMIN SERPL BCG-MCNC: 4.5 G/DL (ref 3.5–5)
ANION GAP SERPL CALCULATED.3IONS-SCNC: 7 MMOL/L (ref 4–13)
BASOPHILS # BLD AUTO: 0.02 THOUSANDS/ÂΜL (ref 0–0.1)
BASOPHILS NFR BLD AUTO: 0 % (ref 0–1)
BUN SERPL-MCNC: 40 MG/DL (ref 5–25)
CALCIUM SERPL-MCNC: 9.7 MG/DL (ref 8.4–10.2)
CHLORIDE SERPL-SCNC: 108 MMOL/L (ref 96–108)
CO2 SERPL-SCNC: 22 MMOL/L (ref 21–32)
CREAT SERPL-MCNC: 2.7 MG/DL (ref 0.6–1.3)
EOSINOPHIL # BLD AUTO: 0.16 THOUSAND/ÂΜL (ref 0–0.61)
EOSINOPHIL NFR BLD AUTO: 3 % (ref 0–6)
ERYTHROCYTE [DISTWIDTH] IN BLOOD BY AUTOMATED COUNT: 13.5 % (ref 11.6–15.1)
GFR SERPL CREATININE-BSD FRML MDRD: 22 ML/MIN/1.73SQ M
GLUCOSE P FAST SERPL-MCNC: 153 MG/DL (ref 65–99)
HCT VFR BLD AUTO: 31.7 % (ref 36.5–49.3)
HGB BLD-MCNC: 10.3 G/DL (ref 12–17)
IMM GRANULOCYTES # BLD AUTO: 0.02 THOUSAND/UL (ref 0–0.2)
IMM GRANULOCYTES NFR BLD AUTO: 0 % (ref 0–2)
LYMPHOCYTES # BLD AUTO: 1.54 THOUSANDS/ÂΜL (ref 0.6–4.47)
LYMPHOCYTES NFR BLD AUTO: 29 % (ref 14–44)
MCH RBC QN AUTO: 29.6 PG (ref 26.8–34.3)
MCHC RBC AUTO-ENTMCNC: 32.5 G/DL (ref 31.4–37.4)
MCV RBC AUTO: 91 FL (ref 82–98)
MONOCYTES # BLD AUTO: 0.47 THOUSAND/ÂΜL (ref 0.17–1.22)
MONOCYTES NFR BLD AUTO: 9 % (ref 4–12)
NEUTROPHILS # BLD AUTO: 3.13 THOUSANDS/ÂΜL (ref 1.85–7.62)
NEUTS SEG NFR BLD AUTO: 59 % (ref 43–75)
NRBC BLD AUTO-RTO: 0 /100 WBCS
PHOSPHATE SERPL-MCNC: 4.1 MG/DL (ref 2.3–4.1)
PLATELET # BLD AUTO: 218 THOUSANDS/UL (ref 149–390)
PMV BLD AUTO: 10 FL (ref 8.9–12.7)
POTASSIUM SERPL-SCNC: 5.9 MMOL/L (ref 3.5–5.3)
RBC # BLD AUTO: 3.48 MILLION/UL (ref 3.88–5.62)
SODIUM SERPL-SCNC: 137 MMOL/L (ref 135–147)
WBC # BLD AUTO: 5.34 THOUSAND/UL (ref 4.31–10.16)

## 2025-04-02 PROCEDURE — 36415 COLL VENOUS BLD VENIPUNCTURE: CPT

## 2025-04-02 PROCEDURE — 80069 RENAL FUNCTION PANEL: CPT

## 2025-04-02 NOTE — TELEPHONE ENCOUNTER
Prescription     Reason for call:   [x] Refill   [] Prior Auth  [] Other:     Office:   [] PCP/Provider -   [x] Specialty/Provider -     Medication: allopurinol (ZYLOPRIM) 100 mg    Dose/Frequency:  Take 0.5 tablets (50 mg total) by mouth daily     Quantity: 45    Pharmacy: [facility]/pharmacy #[id number] - [place] [address] [facility]   Does the patient have enough for 3 days?   [] Yes   [x] No - Send as HP to POD    Mail Away Pharmacy   Does the patient have enough for 10 days?   [] Yes   [] No - Send as HP to POD

## 2025-04-03 ENCOUNTER — RESULTS FOLLOW-UP (OUTPATIENT)
Dept: NEPHROLOGY | Facility: HOSPITAL | Age: 70
End: 2025-04-03

## 2025-04-03 DIAGNOSIS — E11.22 TYPE 2 DIABETES MELLITUS WITH STAGE 3B CHRONIC KIDNEY DISEASE, WITHOUT LONG-TERM CURRENT USE OF INSULIN (HCC): ICD-10-CM

## 2025-04-03 DIAGNOSIS — N18.30 BENIGN HYPERTENSION WITH CKD (CHRONIC KIDNEY DISEASE) STAGE III (HCC): ICD-10-CM

## 2025-04-03 DIAGNOSIS — E87.5 HYPERKALEMIA: Primary | ICD-10-CM

## 2025-04-03 DIAGNOSIS — N18.32 TYPE 2 DIABETES MELLITUS WITH STAGE 3B CHRONIC KIDNEY DISEASE, WITHOUT LONG-TERM CURRENT USE OF INSULIN (HCC): ICD-10-CM

## 2025-04-03 DIAGNOSIS — I12.9 BENIGN HYPERTENSION WITH CKD (CHRONIC KIDNEY DISEASE) STAGE III (HCC): ICD-10-CM

## 2025-04-03 DIAGNOSIS — N18.32 STAGE 3B CHRONIC KIDNEY DISEASE (HCC): ICD-10-CM

## 2025-04-03 RX ORDER — ALLOPURINOL 100 MG/1
50 TABLET ORAL DAILY
Qty: 45 TABLET | Refills: 1 | Status: SHIPPED | OUTPATIENT
Start: 2025-04-03 | End: 2025-07-02

## 2025-04-04 ENCOUNTER — TELEPHONE (OUTPATIENT)
Age: 70
End: 2025-04-04

## 2025-04-04 DIAGNOSIS — E87.5 HYPERKALEMIA: Primary | ICD-10-CM

## 2025-04-04 RX ORDER — CHLORTHALIDONE 25 MG/1
25 TABLET ORAL DAILY
Qty: 90 TABLET | Refills: 3 | Status: SHIPPED | OUTPATIENT
Start: 2025-04-04 | End: 2025-07-03

## 2025-04-04 NOTE — TELEPHONE ENCOUNTER
Kenna calling in from [employer]. Reports that Patiromer Sorbitex Calcium 8.4 g PACK is not on patients formulary. They are stating Lokelma is the preferred drug. Reports if provider is okay w/ switching to Lokelma, it will require a prior authorization once it is submitted to the pharmacy. Asking for Lokelma script to be sent to [facility], if agreeable. Please advise, thank you

## 2025-04-04 NOTE — TELEPHONE ENCOUNTER
Spoke with patient about the following, he has not started sodium bicarb.  His pharmacy never reached out about it since it is not covered by insurance.  I explained it is a supplement so it wouldn't be covered, told him to see how much it costs and if it's too expensive to let us know.  He will come in for samples after seeing how much Veltessa is at the pharmacy:    ----- Message from Marcus Messer MD sent at 2025  7:40 AM EDT -----  Agree with Adela.  Please call the patient to discuss followin.  Kidney function is looking slightly better than last time but potassium level remains elevated.    2.  Increase chlorthalidone to 1 full tablet once a day (he is currently taking half tablet of 25 mg).  New prescription sent to pharmacy.    3.  Continue sodium bicarbonate 1 tablet twice daily (please make sure that he is taking this, prescription was sent recently by Adela).    4.  Continue Veltassa 1 packet daily (he may be using samples that I previously provided, I sent a prescription to his pharmacy to see if he can get more supply).    5.  Keep holding lisinopril for now.    6.  Focus on a low potassium diet.    7.  BMP Monday (order placed).

## 2025-04-07 ENCOUNTER — TELEPHONE (OUTPATIENT)
Age: 70
End: 2025-04-07

## 2025-04-07 NOTE — TELEPHONE ENCOUNTER
PA for LOKELMA SUBMITTED to [facility]    via    []CMM-KEY:   []Surescripts-Case ID #   [x]Availity-Auth ID # NDC # [id number]  []Faxed to plan   []Other website   []Phone call Case ID #     [x]PA sent as URGENT    All office notes, labs and other pertaining documents and studies sent. Clinical questions answered. Awaiting determination from insurance company.     Turnaround time for your insurance to make a decision on your Prior Authorization can take 7-21 business days.

## 2025-04-08 NOTE — TELEPHONE ENCOUNTER
[employer]  APPROVED     Date(s) approved 2/7/25-10/6/25    Case #    Patient advised by          []Consumer Health Advisershart Message  [x]Phone call   []LMOM  []L/M to call office as no active Communication consent on file  []Unable to leave detailed message as VM not approved on Communication consent       Pharmacy advised by    [x]Fax  []Phone call  []Secure Chat       Approval letter scanned into Media Yes

## 2025-04-09 ENCOUNTER — TELEPHONE (OUTPATIENT)
Age: 70
End: 2025-04-09

## 2025-04-09 ENCOUNTER — APPOINTMENT (OUTPATIENT)
Dept: LAB | Facility: HOSPITAL | Age: 70
End: 2025-04-09
Attending: INTERNAL MEDICINE
Payer: COMMERCIAL

## 2025-04-09 DIAGNOSIS — E11.22 TYPE 2 DIABETES MELLITUS WITH STAGE 3B CHRONIC KIDNEY DISEASE, WITHOUT LONG-TERM CURRENT USE OF INSULIN (HCC): ICD-10-CM

## 2025-04-09 DIAGNOSIS — N18.32 TYPE 2 DIABETES MELLITUS WITH STAGE 3B CHRONIC KIDNEY DISEASE, WITHOUT LONG-TERM CURRENT USE OF INSULIN (HCC): ICD-10-CM

## 2025-04-09 DIAGNOSIS — E87.5 HYPERKALEMIA: ICD-10-CM

## 2025-04-09 DIAGNOSIS — N18.4 STAGE 4 CHRONIC KIDNEY DISEASE (HCC): ICD-10-CM

## 2025-04-09 LAB
ALBUMIN SERPL BCG-MCNC: 4.3 G/DL (ref 3.5–5)
ANION GAP SERPL CALCULATED.3IONS-SCNC: 10 MMOL/L (ref 4–13)
BUN SERPL-MCNC: 27 MG/DL (ref 5–25)
CALCIUM SERPL-MCNC: 9.3 MG/DL (ref 8.4–10.2)
CHLORIDE SERPL-SCNC: 107 MMOL/L (ref 96–108)
CO2 SERPL-SCNC: 23 MMOL/L (ref 21–32)
CREAT SERPL-MCNC: 2.28 MG/DL (ref 0.6–1.3)
GFR SERPL CREATININE-BSD FRML MDRD: 27 ML/MIN/1.73SQ M
GLUCOSE P FAST SERPL-MCNC: 155 MG/DL (ref 65–99)
PHOSPHATE SERPL-MCNC: 3 MG/DL (ref 2.3–4.1)
POTASSIUM SERPL-SCNC: 4.5 MMOL/L (ref 3.5–5.3)
SODIUM SERPL-SCNC: 140 MMOL/L (ref 135–147)

## 2025-04-09 PROCEDURE — 80069 RENAL FUNCTION PANEL: CPT

## 2025-04-09 PROCEDURE — 36415 COLL VENOUS BLD VENIPUNCTURE: CPT

## 2025-04-09 NOTE — TELEPHONE ENCOUNTER
Spoke to patients wife Sonia about labs look good and recommendations of taking 1 packet tid a week,(Mon,Wed,Fri).keep hold lisinopril and Continue rest of the medications as such. Labs in 1 month.          ----- Message from Marcus Messer MD sent at 4/9/2025 11:02 AM EDT -----  Please call the patient to review following.  If he does not  please call his wife.    1.  Kidney function is looking better and potassium level is now within normal limits.    2.  Decrease Veltassa to 1 packet 3 times per week (only take on Monday/Wednesday/Friday)    3.  Continue rest of the medications as such.    4.  Keep holding lisinopril.    5.  BMP 1 month.

## 2025-04-09 NOTE — TELEPHONE ENCOUNTER
Spoke with patient letting him know I requested the form from [employer].  He expressed understanding and thanked us for the call.

## 2025-04-09 NOTE — TELEPHONE ENCOUNTER
Patient called in to advise that the provider has been giving samples of Veltassa. Patient advised he called [employer] which is a program for the [facility]. They advised patient that the doctor needs to complete a form for them first.     Patient advised the number for [employer] is [phone].

## 2025-04-28 DIAGNOSIS — E87.5 HYPERKALEMIA: ICD-10-CM

## 2025-04-29 RX ORDER — SODIUM BICARBONATE 650 MG/1
650 TABLET ORAL 2 TIMES DAILY
Qty: 180 TABLET | Refills: 1 | Status: SHIPPED | OUTPATIENT
Start: 2025-04-29

## 2025-05-02 ENCOUNTER — RESULTS FOLLOW-UP (OUTPATIENT)
Dept: NEPHROLOGY | Facility: CLINIC | Age: 70
End: 2025-05-02

## 2025-05-02 ENCOUNTER — APPOINTMENT (OUTPATIENT)
Dept: LAB | Facility: HOSPITAL | Age: 70
End: 2025-05-02
Attending: INTERNAL MEDICINE
Payer: COMMERCIAL

## 2025-05-02 DIAGNOSIS — E87.5 HYPERKALEMIA: ICD-10-CM

## 2025-05-02 DIAGNOSIS — N18.32 TYPE 2 DIABETES MELLITUS WITH STAGE 3B CHRONIC KIDNEY DISEASE, WITHOUT LONG-TERM CURRENT USE OF INSULIN (HCC): ICD-10-CM

## 2025-05-02 DIAGNOSIS — E87.6 HYPOKALEMIA: Primary | ICD-10-CM

## 2025-05-02 DIAGNOSIS — E11.22 TYPE 2 DIABETES MELLITUS WITH STAGE 3B CHRONIC KIDNEY DISEASE, WITHOUT LONG-TERM CURRENT USE OF INSULIN (HCC): ICD-10-CM

## 2025-05-02 DIAGNOSIS — N18.30 BENIGN HYPERTENSION WITH CKD (CHRONIC KIDNEY DISEASE) STAGE III (HCC): ICD-10-CM

## 2025-05-02 DIAGNOSIS — N18.32 STAGE 3B CHRONIC KIDNEY DISEASE (HCC): ICD-10-CM

## 2025-05-02 DIAGNOSIS — I12.9 BENIGN HYPERTENSION WITH CKD (CHRONIC KIDNEY DISEASE) STAGE III (HCC): ICD-10-CM

## 2025-05-02 LAB
ALBUMIN SERPL BCG-MCNC: 4.4 G/DL (ref 3.5–5)
ANION GAP SERPL CALCULATED.3IONS-SCNC: 13 MMOL/L (ref 4–13)
BUN SERPL-MCNC: 38 MG/DL (ref 5–25)
CALCIUM SERPL-MCNC: 9.5 MG/DL (ref 8.4–10.2)
CHLORIDE SERPL-SCNC: 105 MMOL/L (ref 96–108)
CO2 SERPL-SCNC: 23 MMOL/L (ref 21–32)
CREAT SERPL-MCNC: 2.47 MG/DL (ref 0.6–1.3)
GFR SERPL CREATININE-BSD FRML MDRD: 25 ML/MIN/1.73SQ M
GLUCOSE P FAST SERPL-MCNC: 186 MG/DL (ref 65–99)
PHOSPHATE SERPL-MCNC: 3.9 MG/DL (ref 2.3–4.1)
POTASSIUM SERPL-SCNC: 4.5 MMOL/L (ref 3.5–5.3)
SODIUM SERPL-SCNC: 141 MMOL/L (ref 135–147)

## 2025-05-02 PROCEDURE — 80069 RENAL FUNCTION PANEL: CPT

## 2025-05-02 PROCEDURE — 36415 COLL VENOUS BLD VENIPUNCTURE: CPT

## 2025-05-02 NOTE — TELEPHONE ENCOUNTER
Spoke with patient about the following, he expressed understanding and thanked us for the call:    ----- Message from Marcus Messer MD sent at 5/2/2025 11:26 AM EDT -----  Please call the patient to let him know that kidney function is stable.    Potassium level is within normal limits.    Keep taking Veltassa sample 1 packet 3 times per week.    Once he gets Lokelma, take Lokelma 1 packet 3 times per week but stop Veltassa at that time.    Continue rest of the medications as such.    Repeat BMP 1 month.

## 2025-05-16 ENCOUNTER — TELEPHONE (OUTPATIENT)
Age: 70
End: 2025-05-16

## 2025-05-16 NOTE — TELEPHONE ENCOUNTER
Spoke with patient letting him know the forms were faxed to the assistance program.  Explained we received a confirmation fax from them saying they received the information and that it could take up to 3 weeks for them to process.  He expressed understanding and thanked us for the call.

## 2025-05-16 NOTE — TELEPHONE ENCOUNTER
Patient called in to see if we faxed his form for the [employer] assistance program. He said he spoke to Dr Messer and  he said he completed the form and gave to the MA's to fax. Patient is saying they advised him they did not receive it.    Patient would like a call back.

## 2025-06-02 ENCOUNTER — APPOINTMENT (OUTPATIENT)
Dept: LAB | Facility: HOSPITAL | Age: 70
End: 2025-06-02
Payer: COMMERCIAL

## 2025-06-02 DIAGNOSIS — N18.32 TYPE 2 DIABETES MELLITUS WITH STAGE 3B CHRONIC KIDNEY DISEASE, WITHOUT LONG-TERM CURRENT USE OF INSULIN (HCC): ICD-10-CM

## 2025-06-02 DIAGNOSIS — E78.2 MIXED HYPERLIPIDEMIA: ICD-10-CM

## 2025-06-02 DIAGNOSIS — N18.4 STAGE 4 CHRONIC KIDNEY DISEASE (HCC): ICD-10-CM

## 2025-06-02 DIAGNOSIS — E11.22 TYPE 2 DIABETES MELLITUS WITH STAGE 3B CHRONIC KIDNEY DISEASE, WITHOUT LONG-TERM CURRENT USE OF INSULIN (HCC): ICD-10-CM

## 2025-06-03 ENCOUNTER — APPOINTMENT (OUTPATIENT)
Dept: LAB | Facility: HOSPITAL | Age: 70
End: 2025-06-03
Attending: PHYSICIAN ASSISTANT
Payer: COMMERCIAL

## 2025-06-03 ENCOUNTER — RESULTS FOLLOW-UP (OUTPATIENT)
Dept: NEPHROLOGY | Facility: CLINIC | Age: 70
End: 2025-06-03

## 2025-06-03 DIAGNOSIS — E87.6 HYPOKALEMIA: ICD-10-CM

## 2025-06-03 DIAGNOSIS — N18.32 STAGE 3B CHRONIC KIDNEY DISEASE (HCC): Primary | ICD-10-CM

## 2025-06-03 DIAGNOSIS — E87.5 HYPERKALEMIA: ICD-10-CM

## 2025-06-03 LAB
ALBUMIN SERPL BCG-MCNC: 4.6 G/DL (ref 3.5–5)
ANION GAP SERPL CALCULATED.3IONS-SCNC: 13 MMOL/L (ref 4–13)
BUN SERPL-MCNC: 34 MG/DL (ref 5–25)
CALCIUM SERPL-MCNC: 9.6 MG/DL (ref 8.4–10.2)
CHLORIDE SERPL-SCNC: 103 MMOL/L (ref 96–108)
CHOLEST SERPL-MCNC: 135 MG/DL (ref ?–200)
CO2 SERPL-SCNC: 23 MMOL/L (ref 21–32)
CREAT SERPL-MCNC: 2.6 MG/DL (ref 0.6–1.3)
GFR SERPL CREATININE-BSD FRML MDRD: 23 ML/MIN/1.73SQ M
GLUCOSE P FAST SERPL-MCNC: 196 MG/DL (ref 65–99)
HDLC SERPL-MCNC: 38 MG/DL
LDLC SERPL CALC-MCNC: 59 MG/DL (ref 0–100)
NONHDLC SERPL-MCNC: 97 MG/DL
PHOSPHATE SERPL-MCNC: 4.1 MG/DL (ref 2.3–4.1)
POTASSIUM SERPL-SCNC: 4.5 MMOL/L (ref 3.5–5.3)
SODIUM SERPL-SCNC: 139 MMOL/L (ref 135–147)
TRIGL SERPL-MCNC: 191 MG/DL (ref ?–150)

## 2025-06-03 PROCEDURE — 80069 RENAL FUNCTION PANEL: CPT

## 2025-06-03 PROCEDURE — 36415 COLL VENOUS BLD VENIPUNCTURE: CPT

## 2025-06-03 PROCEDURE — 80061 LIPID PANEL: CPT

## 2025-06-03 RX ORDER — LISINOPRIL 20 MG/1
20 TABLET ORAL DAILY
Qty: 90 TABLET | Refills: 3 | Status: SHIPPED | OUTPATIENT
Start: 2025-06-03 | End: 2025-09-01

## 2025-07-02 ENCOUNTER — APPOINTMENT (OUTPATIENT)
Dept: LAB | Facility: HOSPITAL | Age: 70
End: 2025-07-02
Attending: INTERNAL MEDICINE
Payer: COMMERCIAL

## 2025-07-02 DIAGNOSIS — N18.4 STAGE 4 CHRONIC KIDNEY DISEASE (HCC): ICD-10-CM

## 2025-07-02 DIAGNOSIS — E87.5 HYPERKALEMIA: ICD-10-CM

## 2025-07-02 DIAGNOSIS — E11.22 TYPE 2 DIABETES MELLITUS WITH STAGE 3B CHRONIC KIDNEY DISEASE, WITHOUT LONG-TERM CURRENT USE OF INSULIN (HCC): ICD-10-CM

## 2025-07-02 DIAGNOSIS — N18.32 TYPE 2 DIABETES MELLITUS WITH STAGE 3B CHRONIC KIDNEY DISEASE, WITHOUT LONG-TERM CURRENT USE OF INSULIN (HCC): ICD-10-CM

## 2025-07-02 DIAGNOSIS — N18.32 STAGE 3B CHRONIC KIDNEY DISEASE (HCC): ICD-10-CM

## 2025-07-02 LAB
ALBUMIN SERPL BCG-MCNC: 4.5 G/DL (ref 3.5–5)
ANION GAP SERPL CALCULATED.3IONS-SCNC: 9 MMOL/L (ref 4–13)
BUN SERPL-MCNC: 42 MG/DL (ref 5–25)
CALCIUM SERPL-MCNC: 9.9 MG/DL (ref 8.4–10.2)
CHLORIDE SERPL-SCNC: 103 MMOL/L (ref 96–108)
CO2 SERPL-SCNC: 26 MMOL/L (ref 21–32)
CREAT SERPL-MCNC: 3.03 MG/DL (ref 0.6–1.3)
GFR SERPL CREATININE-BSD FRML MDRD: 19 ML/MIN/1.73SQ M
GLUCOSE P FAST SERPL-MCNC: 182 MG/DL (ref 65–99)
PHOSPHATE SERPL-MCNC: 3.8 MG/DL (ref 2.3–4.1)
POTASSIUM SERPL-SCNC: 5.1 MMOL/L (ref 3.5–5.3)
SODIUM SERPL-SCNC: 138 MMOL/L (ref 135–147)

## 2025-07-02 PROCEDURE — 80069 RENAL FUNCTION PANEL: CPT

## 2025-07-02 PROCEDURE — 36415 COLL VENOUS BLD VENIPUNCTURE: CPT

## 2025-08-04 ENCOUNTER — APPOINTMENT (OUTPATIENT)
Dept: LAB | Facility: HOSPITAL | Age: 70
End: 2025-08-04
Attending: INTERNAL MEDICINE
Payer: COMMERCIAL

## 2025-08-04 DIAGNOSIS — Z87.39 HISTORY OF GOUT: ICD-10-CM

## 2025-08-04 DIAGNOSIS — N25.81 SECONDARY HYPERPARATHYROIDISM (HCC): ICD-10-CM

## 2025-08-04 DIAGNOSIS — N18.30 BENIGN HYPERTENSION WITH CKD (CHRONIC KIDNEY DISEASE) STAGE III (HCC): ICD-10-CM

## 2025-08-04 DIAGNOSIS — N18.32 TYPE 2 DIABETES MELLITUS WITH STAGE 3B CHRONIC KIDNEY DISEASE, WITHOUT LONG-TERM CURRENT USE OF INSULIN (HCC): ICD-10-CM

## 2025-08-04 DIAGNOSIS — N18.4 STAGE 4 CHRONIC KIDNEY DISEASE (HCC): ICD-10-CM

## 2025-08-04 DIAGNOSIS — E11.22 TYPE 2 DIABETES MELLITUS WITH STAGE 3B CHRONIC KIDNEY DISEASE, WITHOUT LONG-TERM CURRENT USE OF INSULIN (HCC): ICD-10-CM

## 2025-08-04 DIAGNOSIS — I12.9 BENIGN HYPERTENSION WITH CKD (CHRONIC KIDNEY DISEASE) STAGE III (HCC): ICD-10-CM

## 2025-08-04 LAB
ALBUMIN SERPL BCG-MCNC: 4.5 G/DL (ref 3.5–5)
ANION GAP SERPL CALCULATED.3IONS-SCNC: 8 MMOL/L (ref 4–13)
BUN SERPL-MCNC: 50 MG/DL (ref 5–25)
CALCIUM SERPL-MCNC: 9.8 MG/DL (ref 8.4–10.2)
CHLORIDE SERPL-SCNC: 104 MMOL/L (ref 96–108)
CO2 SERPL-SCNC: 26 MMOL/L (ref 21–32)
CREAT SERPL-MCNC: 3.42 MG/DL (ref 0.6–1.3)
GFR SERPL CREATININE-BSD FRML MDRD: 17 ML/MIN/1.73SQ M
GLUCOSE P FAST SERPL-MCNC: 212 MG/DL (ref 65–99)
PHOSPHATE SERPL-MCNC: 4.6 MG/DL (ref 2.3–4.1)
POTASSIUM SERPL-SCNC: 4.6 MMOL/L (ref 3.5–5.3)
SODIUM SERPL-SCNC: 138 MMOL/L (ref 135–147)

## 2025-08-04 PROCEDURE — 80069 RENAL FUNCTION PANEL: CPT

## 2025-08-04 PROCEDURE — 36415 COLL VENOUS BLD VENIPUNCTURE: CPT

## 2025-08-11 ENCOUNTER — RESULTS FOLLOW-UP (OUTPATIENT)
Dept: OTHER | Facility: HOSPITAL | Age: 70
End: 2025-08-11

## 2025-08-11 ENCOUNTER — APPOINTMENT (OUTPATIENT)
Dept: LAB | Facility: HOSPITAL | Age: 70
End: 2025-08-11
Attending: INTERNAL MEDICINE
Payer: COMMERCIAL

## (undated) DEVICE — ASTOUND STANDARD SURGICAL GOWN, XL: Brand: CONVERTORS

## (undated) DEVICE — POV-IOD SOLUTION 4OZ BT

## (undated) DEVICE — STANDARD SURGICAL GOWN, L: Brand: CONVERTORS

## (undated) DEVICE — SUPER SPONGES,MEDIUM: Brand: KERLIX

## (undated) DEVICE — CHLORASCRUB PREP 1ML

## (undated) DEVICE — STERILE LATEX POWDER-FREE SURGICAL GLOVESWITH NITRILE COATING: Brand: PROTEXIS

## (undated) DEVICE — GLOVE INDICATOR PI UNDERGLOVE SZ 8.5 BLUE

## (undated) DEVICE — NEEDLE 18 G X 1 1/2 SAFETY

## (undated) DEVICE — GLOVE SRG BIOGEL 8

## (undated) DEVICE — PACK GENERAL LF

## (undated) DEVICE — SYRINGE 10ML LL CONTROL TOP

## (undated) DEVICE — BASIC DOUBLE BASIN 2-LF: Brand: MEDLINE INDUSTRIES, INC.

## (undated) DEVICE — SYRINGE 5ML LL

## (undated) DEVICE — NEEDLE SPINAL 25G X 3.5 IN QUINCKE

## (undated) DEVICE — OR2 STERILE LABELS: Brand: CENTURION

## (undated) DEVICE — GROUNDING PAD UNIVERSAL SLW

## (undated) DEVICE — INTENDED FOR TISSUE SEPARATION, AND OTHER PROCEDURES THAT REQUIRE A SHARP SURGICAL BLADE TO PUNCTURE OR CUT.: Brand: BARD-PARKER ® CARBON RIB-BACK BLADES

## (undated) DEVICE — DRAPE LAPAROTOMY W/POUCHES

## (undated) DEVICE — LABEL STERILE (2- POVIDONE IODINE PAINT 2 -POVIDONE IODINE SCRUB 2- CHLOHEXIDINE MOUTHWASH 4 -BLANK MED/SOL

## (undated) DEVICE — PLASTIC ADHESIVE BANDAGE: Brand: CURITY

## (undated) DEVICE — ALL PURPOSE SPONGES,NON-WOVEN, 4 PLY: Brand: CURITY

## (undated) DEVICE — TOWEL SET X-RAY

## (undated) DEVICE — BETADINE SURGICAL SCRUB 32OZ

## (undated) DEVICE — SYRINGE 3ML LL

## (undated) DEVICE — NEEDLE 25G X 1 1/2